# Patient Record
Sex: FEMALE | Race: ASIAN | NOT HISPANIC OR LATINO
[De-identification: names, ages, dates, MRNs, and addresses within clinical notes are randomized per-mention and may not be internally consistent; named-entity substitution may affect disease eponyms.]

---

## 2020-05-13 PROBLEM — Z00.00 ENCOUNTER FOR PREVENTIVE HEALTH EXAMINATION: Status: ACTIVE | Noted: 2020-05-13

## 2020-05-15 ENCOUNTER — APPOINTMENT (OUTPATIENT)
Dept: TRANSPLANT | Facility: CLINIC | Age: 70
End: 2020-05-15

## 2020-06-08 ENCOUNTER — APPOINTMENT (OUTPATIENT)
Dept: SURGERY | Facility: CLINIC | Age: 70
End: 2020-06-08
Payer: MEDICAID

## 2020-06-08 VITALS
TEMPERATURE: 98.1 F | SYSTOLIC BLOOD PRESSURE: 139 MMHG | WEIGHT: 161 LBS | DIASTOLIC BLOOD PRESSURE: 80 MMHG | HEART RATE: 70 BPM | BODY MASS INDEX: 26.82 KG/M2 | HEIGHT: 65 IN

## 2020-06-08 DIAGNOSIS — Z82.49 FAMILY HISTORY OF ISCHEMIC HEART DISEASE AND OTHER DISEASES OF THE CIRCULATORY SYSTEM: ICD-10-CM

## 2020-06-08 DIAGNOSIS — Z78.9 OTHER SPECIFIED HEALTH STATUS: ICD-10-CM

## 2020-06-08 DIAGNOSIS — Z83.3 FAMILY HISTORY OF DIABETES MELLITUS: ICD-10-CM

## 2020-06-08 PROCEDURE — 99203 OFFICE O/P NEW LOW 30 MIN: CPT

## 2020-06-12 ENCOUNTER — OUTPATIENT (OUTPATIENT)
Dept: OUTPATIENT SERVICES | Facility: HOSPITAL | Age: 70
LOS: 1 days | Discharge: HOME | End: 2020-06-12
Payer: MEDICAID

## 2020-06-12 VITALS
SYSTOLIC BLOOD PRESSURE: 138 MMHG | TEMPERATURE: 98 F | HEIGHT: 61 IN | WEIGHT: 153.44 LBS | DIASTOLIC BLOOD PRESSURE: 71 MMHG | HEART RATE: 68 BPM | RESPIRATION RATE: 16 BRPM

## 2020-06-12 DIAGNOSIS — N18.9 CHRONIC KIDNEY DISEASE, UNSPECIFIED: ICD-10-CM

## 2020-06-12 DIAGNOSIS — Z98.890 OTHER SPECIFIED POSTPROCEDURAL STATES: Chronic | ICD-10-CM

## 2020-06-12 DIAGNOSIS — E07.9 DISORDER OF THYROID, UNSPECIFIED: ICD-10-CM

## 2020-06-12 DIAGNOSIS — Z01.818 ENCOUNTER FOR OTHER PREPROCEDURAL EXAMINATION: ICD-10-CM

## 2020-06-12 DIAGNOSIS — E78.5 HYPERLIPIDEMIA, UNSPECIFIED: ICD-10-CM

## 2020-06-12 DIAGNOSIS — I10 ESSENTIAL (PRIMARY) HYPERTENSION: ICD-10-CM

## 2020-06-12 LAB
ANION GAP SERPL CALC-SCNC: 11 MMOL/L — SIGNIFICANT CHANGE UP (ref 5–17)
APTT BLD: 37.3 SEC — HIGH (ref 27.5–36.3)
BASOPHILS # BLD AUTO: 0.05 K/UL — SIGNIFICANT CHANGE UP (ref 0–0.2)
BASOPHILS NFR BLD AUTO: 0.5 % — SIGNIFICANT CHANGE UP (ref 0–2)
BUN SERPL-MCNC: 43 MG/DL — HIGH (ref 7–23)
CALCIUM SERPL-MCNC: 8.1 MG/DL — LOW (ref 8.5–10.1)
CHLORIDE SERPL-SCNC: 102 MMOL/L — SIGNIFICANT CHANGE UP (ref 96–108)
CO2 SERPL-SCNC: 24 MMOL/L — SIGNIFICANT CHANGE UP (ref 22–31)
CREAT SERPL-MCNC: 6.84 MG/DL — HIGH (ref 0.5–1.3)
EOSINOPHIL # BLD AUTO: 0.17 K/UL — SIGNIFICANT CHANGE UP (ref 0–0.5)
EOSINOPHIL NFR BLD AUTO: 1.5 % — SIGNIFICANT CHANGE UP (ref 0–6)
GLUCOSE SERPL-MCNC: 215 MG/DL — HIGH (ref 70–99)
HCT VFR BLD CALC: 30 % — LOW (ref 34.5–45)
HGB BLD-MCNC: 9.7 G/DL — LOW (ref 11.5–15.5)
IMM GRANULOCYTES NFR BLD AUTO: 0.4 % — SIGNIFICANT CHANGE UP (ref 0–1.5)
INR BLD: 0.97 RATIO — SIGNIFICANT CHANGE UP (ref 0.88–1.16)
LYMPHOCYTES # BLD AUTO: 18.3 % — SIGNIFICANT CHANGE UP (ref 13–44)
LYMPHOCYTES # BLD AUTO: 2.01 K/UL — SIGNIFICANT CHANGE UP (ref 1–3.3)
MCHC RBC-ENTMCNC: 28.4 PG — SIGNIFICANT CHANGE UP (ref 27–34)
MCHC RBC-ENTMCNC: 32.3 GM/DL — SIGNIFICANT CHANGE UP (ref 32–36)
MCV RBC AUTO: 88 FL — SIGNIFICANT CHANGE UP (ref 80–100)
MONOCYTES # BLD AUTO: 0.79 K/UL — SIGNIFICANT CHANGE UP (ref 0–0.9)
MONOCYTES NFR BLD AUTO: 7.2 % — SIGNIFICANT CHANGE UP (ref 2–14)
NEUTROPHILS # BLD AUTO: 7.93 K/UL — HIGH (ref 1.8–7.4)
NEUTROPHILS NFR BLD AUTO: 72.1 % — SIGNIFICANT CHANGE UP (ref 43–77)
NRBC # BLD: 0 /100 WBCS — SIGNIFICANT CHANGE UP (ref 0–0)
PLATELET # BLD AUTO: 237 K/UL — SIGNIFICANT CHANGE UP (ref 150–400)
POTASSIUM SERPL-MCNC: 4.1 MMOL/L — SIGNIFICANT CHANGE UP (ref 3.5–5.3)
POTASSIUM SERPL-SCNC: 4.1 MMOL/L — SIGNIFICANT CHANGE UP (ref 3.5–5.3)
PROTHROM AB SERPL-ACNC: 10.8 SEC — SIGNIFICANT CHANGE UP (ref 10–12.9)
RBC # BLD: 3.41 M/UL — LOW (ref 3.8–5.2)
RBC # FLD: 12.6 % — SIGNIFICANT CHANGE UP (ref 10.3–14.5)
SODIUM SERPL-SCNC: 137 MMOL/L — SIGNIFICANT CHANGE UP (ref 135–145)
WBC # BLD: 10.99 K/UL — HIGH (ref 3.8–10.5)
WBC # FLD AUTO: 10.99 K/UL — HIGH (ref 3.8–10.5)

## 2020-06-12 PROCEDURE — 93010 ELECTROCARDIOGRAM REPORT: CPT

## 2020-06-12 NOTE — H&P PST ADULT - HISTORY OF PRESENT ILLNESS
70F pmh htn, dm, hl, esrd, thyroid disease here for PST for scheduled laparoscopic insertion of a peritoneal dialysis catheter  This patient denies any fever, cough, sob, rash, flu like symptoms or travel outside of the [place] in the past 30 days

## 2020-06-12 NOTE — H&P PST ADULT - NSICDXPASTMEDICALHX_GEN_ALL_CORE_FT
PAST MEDICAL HISTORY:  Anemia     DM (diabetes mellitus)     ESRD (end stage renal disease)     GERD (gastroesophageal reflux disease)     HTN (hypertension)     Thyroid disease

## 2020-06-12 NOTE — H&P PST ADULT - NSICDXPROBLEM_GEN_ALL_CORE_FT
PROBLEM DIAGNOSES  Problem: Chronic kidney disease, unspecified  Assessment and Plan: Laparoscopic insertion of a peritoneal dialysis catheter  Pre-op instructions given by RN, patient verbalized understanding  Chlorhexidine wash instructions given   pending: medical clearance  patient to return on 6- for covid testing    Problem: Thyroid disease  Assessment and Plan: Continue current regimen and medications.     Problem: HLD (hyperlipidemia)  Assessment and Plan: Continue current regimen and medications.     Problem: HTN (hypertension)  Assessment and Plan: Continue current regimen and medications.

## 2020-06-12 NOTE — H&P PST ADULT - ASSESSMENT
70F pmh htn, dm, hl, esrd, thyroid disease here for PST for scheduled laparoscopic insertion of a peritoneal dialysis catheter  CAPRINI SCORE    AGE RELATED RISK FACTORS                                                       MOBILITY RELATED FACTORS  [ ] Age 41-60 years                                            (1 Point)                  [ ] Bed rest                                                        (1 Point)  [x ] Age: 61-74 years                                           (2 Points)                [ ] Plaster cast                                                   (2 Points)  [ ] Age= 75 years                                              (3 Points)                 [ ] Bed bound for more than 72 hours                   (2 Points)    DISEASE RELATED RISK FACTORS                                               GENDER SPECIFIC FACTORS  [ x] Edema in the lower extremities                       (1 Point)                  [ ] Pregnancy                                                     (1 Point)  [ ] Varicose veins                                               (1 Point)                  [ ] Post-partum < 6 weeks                                   (1 Point)             [x BMI > 25 Kg/m2                                            (1 Point)                  [ ] Hormonal therapy  or oral contraception            (1 Point)                 [ ] Sepsis (in the previous month)                        (1 Point)                  [ ] History of pregnancy complications  [ ] Pneumonia or serious lung disease                                               [ ] Unexplained or recurrent                       (1 Point)           (in the previous month)                               (1 Point)  [ ] Abnormal pulmonary function test                     (1 Point)                 SURGERY RELATED RISK FACTORS  [ ] Acute myocardial infarction                              (1 Point)                 [ ]  Section                                            (1 Point)  [ ] Congestive heart failure (in the previous month)  (1 Point)                 [ ] Minor surgery                                                 (1 Point)   [ ] Inflammatory bowel disease                             (1 Point)                 [ ] Arthroscopic surgery                                        (2 Points)  [ ] Central venous access                                    (2 Points)                [x ] General surgery lasting more than 45 minutes   (2 Points)       [ ] Stroke (in the previous month)                          (5 Points)               [ ] Elective arthroplasty                                        (5 Points)                                                                                                                                               HEMATOLOGY RELATED FACTORS                                                 TRAUMA RELATED RISK FACTORS  [ ] Prior episodes of VTE                                     (3 Points)                 [ ] Fracture of the hip, pelvis, or leg                       (5 Points)  [ ] Positive family history for VTE                         (3 Points)                 [ ] Acute spinal cord injury (in the previous month)  (5 Points)  [ ] Prothrombin 41858 A                                      (3 Points)                 [ ] Paralysis  (less than 1 month)                          (5 Points)  [ ] Factor V Leiden                                             (3 Points)                 [ ] Multiple Trauma within 1 month                         (5 Points)  [ ] Lupus anticoagulants                                     (3 Points)                                                           [ ] Anticardiolipin antibodies                                (3 Points)                                                       [ ] High homocysteine in the blood                      (3 Points)                                             [ ] Other congenital or acquired thrombophilia       (3 Points)                                                [ ] Heparin induced thrombocytopenia                  (3 Points)                                          Total Score [      6    ]

## 2020-06-14 LAB — SARS-COV-2 RNA SPEC QL NAA+PROBE: SIGNIFICANT CHANGE UP

## 2020-06-15 ENCOUNTER — TRANSCRIPTION ENCOUNTER (OUTPATIENT)
Age: 70
End: 2020-06-15

## 2020-06-16 ENCOUNTER — OUTPATIENT (OUTPATIENT)
Dept: OUTPATIENT SERVICES | Facility: HOSPITAL | Age: 70
LOS: 1 days | Discharge: HOME | End: 2020-06-16
Payer: MEDICAID

## 2020-06-16 ENCOUNTER — APPOINTMENT (OUTPATIENT)
Dept: SURGERY | Facility: HOSPITAL | Age: 70
End: 2020-06-16

## 2020-06-16 VITALS
SYSTOLIC BLOOD PRESSURE: 154 MMHG | DIASTOLIC BLOOD PRESSURE: 74 MMHG | WEIGHT: 160.94 LBS | HEIGHT: 61 IN | OXYGEN SATURATION: 98 % | HEART RATE: 70 BPM | TEMPERATURE: 97 F | RESPIRATION RATE: 18 BRPM

## 2020-06-16 VITALS
HEART RATE: 65 BPM | SYSTOLIC BLOOD PRESSURE: 138 MMHG | RESPIRATION RATE: 16 BRPM | DIASTOLIC BLOOD PRESSURE: 70 MMHG | OXYGEN SATURATION: 98 %

## 2020-06-16 DIAGNOSIS — Z98.890 OTHER SPECIFIED POSTPROCEDURAL STATES: Chronic | ICD-10-CM

## 2020-06-16 LAB
GLUCOSE BLDC GLUCOMTR-MCNC: 166 MG/DL — HIGH (ref 70–99)
GLUCOSE BLDC GLUCOMTR-MCNC: 177 MG/DL — HIGH (ref 70–99)

## 2020-06-16 PROCEDURE — 49324 LAP INSERT TUNNEL IP CATH: CPT

## 2020-06-16 PROCEDURE — 49324 LAP INSERT TUNNEL IP CATH: CPT | Mod: AS

## 2020-06-16 RX ORDER — ONDANSETRON 8 MG/1
4 TABLET, FILM COATED ORAL ONCE
Refills: 0 | Status: COMPLETED | OUTPATIENT
Start: 2020-06-16 | End: 2020-06-16

## 2020-06-16 RX ORDER — HYDROMORPHONE HYDROCHLORIDE 2 MG/ML
0.5 INJECTION INTRAMUSCULAR; INTRAVENOUS; SUBCUTANEOUS
Refills: 0 | Status: DISCONTINUED | OUTPATIENT
Start: 2020-06-16 | End: 2020-06-16

## 2020-06-16 RX ORDER — SODIUM CHLORIDE 9 MG/ML
1000 INJECTION, SOLUTION INTRAVENOUS
Refills: 0 | Status: DISCONTINUED | OUTPATIENT
Start: 2020-06-16 | End: 2020-06-16

## 2020-06-16 RX ADMIN — HYDROMORPHONE HYDROCHLORIDE 0.5 MILLIGRAM(S): 2 INJECTION INTRAMUSCULAR; INTRAVENOUS; SUBCUTANEOUS at 09:35

## 2020-06-16 RX ADMIN — ONDANSETRON 4 MILLIGRAM(S): 8 TABLET, FILM COATED ORAL at 09:39

## 2020-06-16 NOTE — ASU DISCHARGE PLAN (ADULT/PEDIATRIC) - CALL YOUR DOCTOR IF YOU HAVE ANY OF THE FOLLOWING:
Fever greater than (need to indicate Fahrenheit or Celsius)/Swelling that gets worse/Bleeding that does not stop/Wound/Surgical Site with redness, or foul smelling discharge or pus

## 2020-06-16 NOTE — ASU PATIENT PROFILE, ADULT - PMH
Anemia    DM (diabetes mellitus)    ESRD (end stage renal disease)    GERD (gastroesophageal reflux disease)    HTN (hypertension)    Thyroid disease

## 2020-06-16 NOTE — BRIEF OPERATIVE NOTE - NSICDXBRIEFPROCEDURE_GEN_ALL_CORE_FT
PROCEDURES:  Laparoscopy, with peritoneal dialysis catheter insertion 16-Jun-2020 09:13:52  Srinivasa Diego

## 2020-06-17 DIAGNOSIS — G89.18 OTHER ACUTE POSTPROCEDURAL PAIN: ICD-10-CM

## 2020-06-18 DIAGNOSIS — Z79.4 LONG TERM (CURRENT) USE OF INSULIN: ICD-10-CM

## 2020-06-18 DIAGNOSIS — D64.9 ANEMIA, UNSPECIFIED: ICD-10-CM

## 2020-06-18 DIAGNOSIS — K21.9 GASTRO-ESOPHAGEAL REFLUX DISEASE WITHOUT ESOPHAGITIS: ICD-10-CM

## 2020-06-18 DIAGNOSIS — N18.6 END STAGE RENAL DISEASE: ICD-10-CM

## 2020-06-18 DIAGNOSIS — E78.5 HYPERLIPIDEMIA, UNSPECIFIED: ICD-10-CM

## 2020-06-18 DIAGNOSIS — I12.0 HYPERTENSIVE CHRONIC KIDNEY DISEASE WITH STAGE 5 CHRONIC KIDNEY DISEASE OR END STAGE RENAL DISEASE: ICD-10-CM

## 2020-06-18 DIAGNOSIS — E03.9 HYPOTHYROIDISM, UNSPECIFIED: ICD-10-CM

## 2020-06-18 DIAGNOSIS — E11.22 TYPE 2 DIABETES MELLITUS WITH DIABETIC CHRONIC KIDNEY DISEASE: ICD-10-CM

## 2020-06-20 ENCOUNTER — INPATIENT (INPATIENT)
Facility: HOSPITAL | Age: 70
LOS: 2 days | Discharge: HOME | End: 2020-06-23
Attending: INTERNAL MEDICINE | Admitting: INTERNAL MEDICINE
Payer: MEDICAID

## 2020-06-20 VITALS — HEIGHT: 61 IN

## 2020-06-20 DIAGNOSIS — E07.9 DISORDER OF THYROID, UNSPECIFIED: ICD-10-CM

## 2020-06-20 DIAGNOSIS — N18.6 END STAGE RENAL DISEASE: ICD-10-CM

## 2020-06-20 DIAGNOSIS — Z98.890 OTHER SPECIFIED POSTPROCEDURAL STATES: Chronic | ICD-10-CM

## 2020-06-20 DIAGNOSIS — E87.79 OTHER FLUID OVERLOAD: ICD-10-CM

## 2020-06-20 DIAGNOSIS — I10 ESSENTIAL (PRIMARY) HYPERTENSION: ICD-10-CM

## 2020-06-20 DIAGNOSIS — R11.2 NAUSEA WITH VOMITING, UNSPECIFIED: ICD-10-CM

## 2020-06-20 DIAGNOSIS — E11.9 TYPE 2 DIABETES MELLITUS WITHOUT COMPLICATIONS: ICD-10-CM

## 2020-06-20 DIAGNOSIS — Z29.9 ENCOUNTER FOR PROPHYLACTIC MEASURES, UNSPECIFIED: ICD-10-CM

## 2020-06-20 LAB
ALBUMIN SERPL ELPH-MCNC: 4.1 G/DL — SIGNIFICANT CHANGE UP (ref 3.3–5)
ALP SERPL-CCNC: 104 U/L — SIGNIFICANT CHANGE UP (ref 40–120)
ALT FLD-CCNC: < 5 U/L — SIGNIFICANT CHANGE UP (ref 4–33)
ANION GAP SERPL CALC-SCNC: 18 MMO/L — HIGH (ref 7–14)
APTT BLD: 37.5 SEC — HIGH (ref 27.5–36.3)
AST SERPL-CCNC: 12 U/L — SIGNIFICANT CHANGE UP (ref 4–32)
BASE EXCESS BLDV CALC-SCNC: -2.4 MMOL/L — SIGNIFICANT CHANGE UP
BASOPHILS # BLD AUTO: 0.05 K/UL — SIGNIFICANT CHANGE UP (ref 0–0.2)
BASOPHILS NFR BLD AUTO: 0.4 % — SIGNIFICANT CHANGE UP (ref 0–2)
BILIRUB SERPL-MCNC: 0.4 MG/DL — SIGNIFICANT CHANGE UP (ref 0.2–1.2)
BLD GP AB SCN SERPL QL: NEGATIVE — SIGNIFICANT CHANGE UP
BLOOD GAS VENOUS - CREATININE: 6.32 MG/DL — HIGH (ref 0.5–1.3)
BLOOD GAS VENOUS - FIO2: 21 — SIGNIFICANT CHANGE UP
BUN SERPL-MCNC: 58 MG/DL — HIGH (ref 7–23)
CALCIUM SERPL-MCNC: 8.5 MG/DL — SIGNIFICANT CHANGE UP (ref 8.4–10.5)
CHLORIDE BLDV-SCNC: 109 MMOL/L — HIGH (ref 96–108)
CHLORIDE SERPL-SCNC: 102 MMOL/L — SIGNIFICANT CHANGE UP (ref 98–107)
CO2 SERPL-SCNC: 20 MMOL/L — LOW (ref 22–31)
CREAT SERPL-MCNC: 6.68 MG/DL — HIGH (ref 0.5–1.3)
EOSINOPHIL # BLD AUTO: 0.12 K/UL — SIGNIFICANT CHANGE UP (ref 0–0.5)
EOSINOPHIL NFR BLD AUTO: 0.9 % — SIGNIFICANT CHANGE UP (ref 0–6)
GAS PNL BLDV: 139 MMOL/L — SIGNIFICANT CHANGE UP (ref 136–146)
GLUCOSE BLDC GLUCOMTR-MCNC: 188 MG/DL — HIGH (ref 70–99)
GLUCOSE BLDV-MCNC: 129 MG/DL — HIGH (ref 70–99)
GLUCOSE SERPL-MCNC: 142 MG/DL — HIGH (ref 70–99)
HBV SURFACE AG SER-ACNC: NEGATIVE — SIGNIFICANT CHANGE UP
HCO3 BLDV-SCNC: 22 MMOL/L — SIGNIFICANT CHANGE UP (ref 20–27)
HCT VFR BLD CALC: 27.5 % — LOW (ref 34.5–45)
HCT VFR BLDV CALC: 28.6 % — LOW (ref 34.5–45)
HGB BLD-MCNC: 9 G/DL — LOW (ref 11.5–15.5)
HGB BLDV-MCNC: 9.2 G/DL — LOW (ref 11.5–15.5)
IMM GRANULOCYTES NFR BLD AUTO: 0.7 % — SIGNIFICANT CHANGE UP (ref 0–1.5)
INR BLD: 1.08 — SIGNIFICANT CHANGE UP (ref 0.88–1.17)
LACTATE BLDV-MCNC: 1.1 MMOL/L — SIGNIFICANT CHANGE UP (ref 0.5–2)
LIDOCAIN IGE QN: 57 U/L — SIGNIFICANT CHANGE UP (ref 7–60)
LYMPHOCYTES # BLD AUTO: 1.83 K/UL — SIGNIFICANT CHANGE UP (ref 1–3.3)
LYMPHOCYTES # BLD AUTO: 13.7 % — SIGNIFICANT CHANGE UP (ref 13–44)
MCHC RBC-ENTMCNC: 28.8 PG — SIGNIFICANT CHANGE UP (ref 27–34)
MCHC RBC-ENTMCNC: 32.7 % — SIGNIFICANT CHANGE UP (ref 32–36)
MCV RBC AUTO: 87.9 FL — SIGNIFICANT CHANGE UP (ref 80–100)
MONOCYTES # BLD AUTO: 0.77 K/UL — SIGNIFICANT CHANGE UP (ref 0–0.9)
MONOCYTES NFR BLD AUTO: 5.7 % — SIGNIFICANT CHANGE UP (ref 2–14)
NEUTROPHILS # BLD AUTO: 10.53 K/UL — HIGH (ref 1.8–7.4)
NEUTROPHILS NFR BLD AUTO: 78.6 % — HIGH (ref 43–77)
NRBC # FLD: 0 K/UL — SIGNIFICANT CHANGE UP (ref 0–0)
PCO2 BLDV: 38 MMHG — LOW (ref 41–51)
PH BLDV: 7.38 PH — SIGNIFICANT CHANGE UP (ref 7.32–7.43)
PLATELET # BLD AUTO: 195 K/UL — SIGNIFICANT CHANGE UP (ref 150–400)
PMV BLD: 12.8 FL — SIGNIFICANT CHANGE UP (ref 7–13)
PO2 BLDV: 46 MMHG — HIGH (ref 35–40)
POTASSIUM BLDV-SCNC: 4.5 MMOL/L — SIGNIFICANT CHANGE UP (ref 3.4–4.5)
POTASSIUM SERPL-MCNC: 4.7 MMOL/L — SIGNIFICANT CHANGE UP (ref 3.5–5.3)
POTASSIUM SERPL-SCNC: 4.7 MMOL/L — SIGNIFICANT CHANGE UP (ref 3.5–5.3)
PROT SERPL-MCNC: 7.5 G/DL — SIGNIFICANT CHANGE UP (ref 6–8.3)
PROTHROM AB SERPL-ACNC: 12.5 SEC — SIGNIFICANT CHANGE UP (ref 9.8–13.1)
RBC # BLD: 3.13 M/UL — LOW (ref 3.8–5.2)
RBC # FLD: 12.9 % — SIGNIFICANT CHANGE UP (ref 10.3–14.5)
RH IG SCN BLD-IMP: POSITIVE — SIGNIFICANT CHANGE UP
RH IG SCN BLD-IMP: POSITIVE — SIGNIFICANT CHANGE UP
SAO2 % BLDV: 81.5 % — SIGNIFICANT CHANGE UP (ref 60–85)
SARS-COV-2 RNA SPEC QL NAA+PROBE: SIGNIFICANT CHANGE UP
SODIUM SERPL-SCNC: 140 MMOL/L — SIGNIFICANT CHANGE UP (ref 135–145)
WBC # BLD: 13.4 K/UL — HIGH (ref 3.8–10.5)
WBC # FLD AUTO: 13.4 K/UL — HIGH (ref 3.8–10.5)

## 2020-06-20 PROCEDURE — 99233 SBSQ HOSP IP/OBS HIGH 50: CPT

## 2020-06-20 PROCEDURE — 74176 CT ABD & PELVIS W/O CONTRAST: CPT | Mod: 26

## 2020-06-20 PROCEDURE — 99285 EMERGENCY DEPT VISIT HI MDM: CPT

## 2020-06-20 PROCEDURE — 71045 X-RAY EXAM CHEST 1 VIEW: CPT | Mod: 26

## 2020-06-20 RX ORDER — DEXTROSE 50 % IN WATER 50 %
15 SYRINGE (ML) INTRAVENOUS ONCE
Refills: 0 | Status: DISCONTINUED | OUTPATIENT
Start: 2020-06-20 | End: 2020-06-23

## 2020-06-20 RX ORDER — SODIUM CHLORIDE 9 MG/ML
1000 INJECTION INTRAMUSCULAR; INTRAVENOUS; SUBCUTANEOUS ONCE
Refills: 0 | Status: COMPLETED | OUTPATIENT
Start: 2020-06-20 | End: 2020-06-20

## 2020-06-20 RX ORDER — ONDANSETRON 8 MG/1
4 TABLET, FILM COATED ORAL EVERY 6 HOURS
Refills: 0 | Status: DISCONTINUED | OUTPATIENT
Start: 2020-06-20 | End: 2020-06-23

## 2020-06-20 RX ORDER — BISOPROLOL FUMARATE 10 MG/1
1 TABLET, FILM COATED ORAL
Qty: 0 | Refills: 0 | DISCHARGE

## 2020-06-20 RX ORDER — DEXTROSE 50 % IN WATER 50 %
25 SYRINGE (ML) INTRAVENOUS ONCE
Refills: 0 | Status: DISCONTINUED | OUTPATIENT
Start: 2020-06-20 | End: 2020-06-23

## 2020-06-20 RX ORDER — SODIUM BICARBONATE 1 MEQ/ML
650 SYRINGE (ML) INTRAVENOUS
Refills: 0 | Status: DISCONTINUED | OUTPATIENT
Start: 2020-06-20 | End: 2020-06-23

## 2020-06-20 RX ORDER — BISOPROLOL FUMARATE 10 MG/1
5 TABLET, FILM COATED ORAL AT BEDTIME
Refills: 0 | Status: DISCONTINUED | OUTPATIENT
Start: 2020-06-20 | End: 2020-06-23

## 2020-06-20 RX ORDER — LEVOTHYROXINE SODIUM 125 MCG
50 TABLET ORAL DAILY
Refills: 0 | Status: DISCONTINUED | OUTPATIENT
Start: 2020-06-20 | End: 2020-06-23

## 2020-06-20 RX ORDER — ATORVASTATIN CALCIUM 80 MG/1
40 TABLET, FILM COATED ORAL AT BEDTIME
Refills: 0 | Status: DISCONTINUED | OUTPATIENT
Start: 2020-06-20 | End: 2020-06-23

## 2020-06-20 RX ORDER — FUROSEMIDE 40 MG
80 TABLET ORAL ONCE
Refills: 0 | Status: COMPLETED | OUTPATIENT
Start: 2020-06-20 | End: 2020-06-20

## 2020-06-20 RX ORDER — INSULIN LISPRO 100/ML
VIAL (ML) SUBCUTANEOUS
Refills: 0 | Status: DISCONTINUED | OUTPATIENT
Start: 2020-06-20 | End: 2020-06-23

## 2020-06-20 RX ORDER — FUROSEMIDE 40 MG
80 TABLET ORAL ONCE
Refills: 0 | Status: COMPLETED | OUTPATIENT
Start: 2020-06-20 | End: 2020-06-21

## 2020-06-20 RX ORDER — DEXTROSE 50 % IN WATER 50 %
12.5 SYRINGE (ML) INTRAVENOUS ONCE
Refills: 0 | Status: DISCONTINUED | OUTPATIENT
Start: 2020-06-20 | End: 2020-06-23

## 2020-06-20 RX ORDER — AMLODIPINE BESYLATE 2.5 MG/1
10 TABLET ORAL DAILY
Refills: 0 | Status: DISCONTINUED | OUTPATIENT
Start: 2020-06-20 | End: 2020-06-23

## 2020-06-20 RX ORDER — PANTOPRAZOLE SODIUM 20 MG/1
40 TABLET, DELAYED RELEASE ORAL
Refills: 0 | Status: DISCONTINUED | OUTPATIENT
Start: 2020-06-20 | End: 2020-06-23

## 2020-06-20 RX ORDER — SEVELAMER CARBONATE 2400 MG/1
1600 POWDER, FOR SUSPENSION ORAL
Refills: 0 | Status: DISCONTINUED | OUTPATIENT
Start: 2020-06-20 | End: 2020-06-23

## 2020-06-20 RX ORDER — INSULIN LISPRO 100/ML
VIAL (ML) SUBCUTANEOUS AT BEDTIME
Refills: 0 | Status: DISCONTINUED | OUTPATIENT
Start: 2020-06-20 | End: 2020-06-23

## 2020-06-20 RX ORDER — GLUCAGON INJECTION, SOLUTION 0.5 MG/.1ML
1 INJECTION, SOLUTION SUBCUTANEOUS ONCE
Refills: 0 | Status: DISCONTINUED | OUTPATIENT
Start: 2020-06-20 | End: 2020-06-23

## 2020-06-20 RX ORDER — SODIUM CHLORIDE 9 MG/ML
1000 INJECTION, SOLUTION INTRAVENOUS
Refills: 0 | Status: DISCONTINUED | OUTPATIENT
Start: 2020-06-20 | End: 2020-06-23

## 2020-06-20 RX ORDER — HEPARIN SODIUM 5000 [USP'U]/ML
5000 INJECTION INTRAVENOUS; SUBCUTANEOUS EVERY 8 HOURS
Refills: 0 | Status: DISCONTINUED | OUTPATIENT
Start: 2020-06-20 | End: 2020-06-23

## 2020-06-20 RX ADMIN — AMLODIPINE BESYLATE 10 MILLIGRAM(S): 2.5 TABLET ORAL at 23:16

## 2020-06-20 RX ADMIN — HEPARIN SODIUM 5000 UNIT(S): 5000 INJECTION INTRAVENOUS; SUBCUTANEOUS at 23:16

## 2020-06-20 RX ADMIN — Medication 80 MILLIGRAM(S): at 16:25

## 2020-06-20 RX ADMIN — SODIUM CHLORIDE 1000 MILLILITER(S): 9 INJECTION INTRAMUSCULAR; INTRAVENOUS; SUBCUTANEOUS at 13:27

## 2020-06-20 RX ADMIN — Medication 650 MILLIGRAM(S): at 18:52

## 2020-06-20 RX ADMIN — ATORVASTATIN CALCIUM 40 MILLIGRAM(S): 80 TABLET, FILM COATED ORAL at 23:15

## 2020-06-20 NOTE — H&P ADULT - NSHPREVIEWOFSYSTEMS_GEN_ALL_CORE
CONSTITUTIONAL: No fever; No chills; No night sweats; No weight loss; No fatigue  EYES: No eye pain; No blurry vision  ENMT:  No difficulty hearing; No sinus or throat pain  NECK: No pain or stiffness  RESPIRATORY: No cough; No wheezing; No hemoptysis; +shortness of breath; No sputum production  CARDIOVASCULAR: No chest pain; No palpitations; No leg swelling  GASTROINTESTINAL: No abdominal pain; +nausea; +vomiting; No hematemesis; No diarrhea; No constipation. No melena or hematochezia.  GENITOURINARY: No dysuria; No frequency; No hematuria; No incontinence  NEUROLOGICAL: No headaches; No loss of strength; No numbness  SKIN: No itching/burning; No rashes or lesions   MUSCULOSKELETAL: No joint pain or swelling; No muscle, back, or extremity pain  HEME/LYMPH: No easy bruising, or bleeding gums

## 2020-06-20 NOTE — ED ADULT NURSE NOTE - CHIEF COMPLAINT QUOTE
pt had percutaneous cath placed for home dialysis on mon day  pt has been vomiting  and today vomited 4 times  told to come to er. deneis any other complaints [pt weak and pale at   DM2 pt has DM, htn thyroid , hi cholestrol, and anemia   fs at triage 136   son phone number [phone] Jonathan  son states mom gets very anxious when dialysis is mentioned .

## 2020-06-20 NOTE — CONSULT NOTE ADULT - SUBJECTIVE AND OBJECTIVE BOX
NAISA DONDORIS    Patient is a 70y old  Female who presents with a chief complaint of SOB (20 Jun 2020 18:01)    HPI:    This is a patient with ESRD who was sent to the ER for vomiting. 4 episodes this am. Usually has am nausea. Recent PD catheter insertion.  Reported use of narcotic pain killer yesterday    HEALTH ISSUES - PROBLEM Dx:  Prophylactic measure: Prophylactic measure  Thyroid disease: Thyroid disease  Essential hypertension: Essential hypertension  Type 2 diabetes mellitus without complication, without long-term current use of insulin: Type 2 diabetes mellitus without complication, without long-term current use of insulin  ESRD (end stage renal disease): ESRD (end stage renal disease)  Non-intractable vomiting with nausea: Non-intractable vomiting with nausea  Other hypervolemia: Other hypervolemia        MEDICATIONS  (STANDING):  amLODIPine   Tablet 10 milliGRAM(s) Oral daily  atorvastatin 40 milliGRAM(s) Oral at bedtime  bisoprolol   Tablet 5 milliGRAM(s) Oral at bedtime  dextrose 5%. 1000 milliLiter(s) (50 mL/Hr) IV Continuous <Continuous>  dextrose 50% Injectable 12.5 Gram(s) IV Push once  dextrose 50% Injectable 25 Gram(s) IV Push once  dextrose 50% Injectable 25 Gram(s) IV Push once  heparin   Injectable 5000 Unit(s) SubCutaneous every 8 hours  insulin lispro (HumaLOG) corrective regimen sliding scale   SubCutaneous three times a day before meals  insulin lispro (HumaLOG) corrective regimen sliding scale   SubCutaneous at bedtime  levothyroxine 50 MICROGram(s) Oral daily  pantoprazole    Tablet 40 milliGRAM(s) Oral before breakfast  sevelamer carbonate 1600 milliGRAM(s) Oral three times a day with meals  sodium bicarbonate 650 milliGRAM(s) Oral two times a day    MEDICATIONS  (PRN):  dextrose 40% Gel 15 Gram(s) Oral once PRN Blood Glucose LESS THAN 70 milliGRAM(s)/deciliter  glucagon  Injectable 1 milliGRAM(s) IntraMuscular once PRN Glucose LESS THAN 70 milligrams/deciliter  ondansetron Injectable 4 milliGRAM(s) IV Push every 6 hours PRN Nausea.    Vital Signs Last 24 Hrs  T(C): 37.1 (20 Jun 2020 18:52), Max: 37.1 (20 Jun 2020 15:58)  T(F): 98.7 (20 Jun 2020 18:52), Max: 98.8 (20 Jun 2020 15:58)  HR: 80 (20 Jun 2020 18:52) (72 - 80)  BP: 158/61 (20 Jun 2020 18:52) (158/61 - 186/88)  BP(mean): --  RR: 18 (20 Jun 2020 18:52) (16 - 26)  SpO2: 98% (20 Jun 2020 18:52) (89% - 100%)  Daily Height in cm: 154.94 (20 Jun 2020 12:41)    Daily     PHYSICAL EXAM:  Constitutional:  She appears comfortable and not distressed. Not diaphoretic.    Neck:  The thyroid is normal. Trachea is midline.     Respiratory: The lungs are clear to auscultation. No dullness and expansion is normal.    Cardiovascular: S1 and S2 are normal. No mummurs, rubs or gallops are present.    Gastrointestinal: The abdomen is soft. No tenderness is present. No masses are present. Bowel sounds are normal.    Genitourinary: The bladder is not distended. No CVA tenderness is present.    Extremities: No edema is noted. No deformities are present.    Neurological: Cognition is normal. Tone, power and sensation are normal.     Skin: No lesions are seen  or palpated.                              9.0    13.40 )-----------( 195      ( 20 Jun 2020 13:25 )             27.5     06-20    140  |  102  |  58<H>  ----------------------------<  142<H>  4.7   |  20<L>  |  6.68<H>    Ca    8.5      20 Jun 2020 13:25    TPro  7.5  /  Alb  4.1  /  TBili  0.4  /  DBili  x   /  AST  12  /  ALT  < 5  /  AlkPhos  104  06-20

## 2020-06-20 NOTE — H&P ADULT - HISTORY OF PRESENT ILLNESS
70F with PMH HTN, T2DM, ESRD with recently placed PD cath, not started on HD yet, presented with nausea and vomiting. Patient's son provided translation at bedside. He reports patient been experiencing morning nausea and vomiting, usually once a day in the morning. This morning she had 4 episodes of NBNB vomiting. Denied any associated abdominal pain or diarrhea. The son was concern, thus called patient's nephrologist, who instructed patient to come to ED. Patient's vomiting had resolved at time of arrival. Patient denied any CP or SOB during this time.     In ED patient was afebrile, hypertensive, saturating 100% on RA. She received 1L NS bolus in ED. Labs were grossly unremarkable except for elevated Cr. In ED, patient became short of breath and noted to become hypoxic to 80s on RA and subsequently placed on NC and received Lasix 80 IVP. She had CT a/p which didn't show any acute pathology.

## 2020-06-20 NOTE — ED ADULT NURSE NOTE - OBJECTIVE STATEMENT
Pt presenting to room 26 AxOx4 ambulatory at baseline with c.o nausea/vomiting x1 day. Past medical history of HTN, CKD- pt had a percutaneous catheter for peritoneal dialysis placed several days ago. Catheter site appears intact, nontender without redness noted. RR even and unlabored. Palor/diaphoresis not noted. IV established with 20g in RAC, labs drawn and sent. MD at bedside, will continue to monitor.

## 2020-06-20 NOTE — H&P ADULT - PROBLEM SELECTOR PLAN 5
hypertensive at this time, possibly due to fluid?  c/w amlodipine  c/w Bisoprolol  hydralazine IVP if patient remain hypertensive.

## 2020-06-20 NOTE — H&P ADULT - PROBLEM SELECTOR PLAN 1
found hypoxic in ED after IVF, mildly tachypneic.   lungs clear at time of evaluation.   CT showing mild b/l pleural effusion, which is likely due to chronic renal failure.   s/p Lasix 80 in ED.   monitor O2 sat, can give additional lasix prn based on response.  Nephrology following. might need to initial HD if not responding to lasix.

## 2020-06-20 NOTE — H&P ADULT - PROBLEM SELECTOR PLAN 3
currently not on HD yet.   s/p PD cath  nephro following, f/u recs.   no indication for urgent HD at his time.

## 2020-06-20 NOTE — ED PROVIDER NOTE - ATTENDING CONTRIBUTION TO CARE
agree with PA note    "69 y/o female pmh htn, dm, ESRD no HD c/o vomiting x today. Pt is Uz[place] speaking,  # [id number], Pt admits to vomiting x4 this AM and son brought her to the ER. Pt states that the vomiting was white and watery, denies blood or black color. Pt now denies any complaints. Denies chest pain, sob, abd pain, diarrhea, numbness, tingling, weakness, dizziness, syncope, fever or chills. Pt states that she had surgery x5 days ago by her belly button but does not know what for."  Pt denies any complaints to me at this time    PE: well appearing; VSS; CTAB/L; s1 s2 no m/r/g abd soft/NT/ND PD catheter C/D/I; ext: no edema    given vomiting and age and recent procedure will get CT but pt much improved from initial arrival without any interventions

## 2020-06-20 NOTE — CONSULT NOTE ADULT - ASSESSMENT
ESRD: S/P PD catheter 4 days ago. Not on PD as yet. She is clinically euvolemic at this time.  - I will not start HD or low volume PD but do a catheter flush today.    Vomiting: Unlikely due to uremia. She feels better not. Possible due to gastroparesis. She may have gotten a narcotic for pain but thet is unclear.  - Hold HD.  - Odansetron PRN.  - Follow up BMP.    Leukocytosis:  No fever or any other findings of SIRS.  - Monitor.  - Catheter flush.

## 2020-06-20 NOTE — ED PROVIDER NOTE - CLINICAL SUMMARY MEDICAL DECISION MAKING FREE TEXT BOX
71 y/o female pmh htn, hld, ESRD w/ vomiting x4 today after peritoneal dialysis catheter placement x5 days ago- will check labs, CT abd and reassess

## 2020-06-20 NOTE — ED ADULT NURSE REASSESSMENT NOTE - NS ED NURSE REASSESS COMMENT FT1
Pt resting comfortably at this time with 2L NC, maintaining O2 saturation at 96%. Pt remains dyspneic with RR low 20s. Palor/diaphoresis not noted. Report given to SHAYLA Banks on accepting unit. Pt awaiting transport. Will continue to monitor.

## 2020-06-20 NOTE — ED ADULT TRIAGE NOTE - CHIEF COMPLAINT QUOTE
pt had percutaneous cath placed for home dialysis on mon day  pt has been vomiting  and today vomited 4 times  told to come to er. deneis any other complaints [pt weak and pale at   DM2 pt has DM, htn thyroid , hi cholestrol, and anemia   fs at triage 136   son phone number [phone] Jonathan pt had percutaneous cath placed for home dialysis on mon day  pt has been vomiting  and today vomited 4 times  told to come to er. deneis any other complaints [pt weak and pale at   DM2 pt has DM, htn thyroid , hi cholestrol, and anemia   fs at triage 136   son phone number [phone] Jonahtan  son states mom gets very anxious when dialysis is mentioned .

## 2020-06-20 NOTE — ED PROVIDER NOTE - OBJECTIVE STATEMENT
71 y/o female pmh htn, dm, ESRD no HD c/o vomiting x today. Pt is Estonian speaking,  # [id number], Pt admits to vomiting x4 this AM and son brought her to the ER. Pt states that the vomiting was white and watery, denies blood or black color. Pt now denies any complaints. Denies chest pain, sob, abd pain, diarrhea, numbness, tingling, weakness, dizziness, syncope, fever or chills. Pt states that she had surgery x5 days ago by her belly button but does not know what for.

## 2020-06-20 NOTE — ED PROVIDER NOTE - PHYSICAL EXAMINATION
Peritoneal dialysis catheter in place, laparoscopic surgery site dry, sutures in place, no erythema or drainage.

## 2020-06-20 NOTE — H&P ADULT - ASSESSMENT
70F with HTN, T2DM, ESRD not on dialysis yet presented with GI symptoms, which resolved on presentation. Her GI symptoms are possible related to uremia vs underline gastroparesis from long standing DM. She became hypoxic in ED after receiving IVF. noted to be hypertensive, likely fluid overload.

## 2020-06-20 NOTE — H&P ADULT - NSHPPHYSICALEXAM_GEN_ALL_CORE
T(C): 37.1 (06-20-20 @ 15:58), Max: 37.1 (06-20-20 @ 15:58)  HR: 79 (06-20-20 @ 16:25) (72 - 79)  BP: 184/76 (06-20-20 @ 16:25) (169/70 - 184/76)  RR: 26 (06-20-20 @ 16:25) (16 - 26)  SpO2: 94% (06-20-20 @ 16:25) (89% - 100%)    GENERAL: no apparent distress, on room air  HEAD:  Atraumatic, Normocephalic  EYES: EOMI, PERRLA, conjunctiva and sclera clear b/l  NECK: No cervical lymphadenopathy; no obvious masses.   CHEST/LUNG: Clear to auscultation bilaterally; No wheezing or crackles  HEART: Regular rate and rhythm; No obvious murmurs; nl S1/S2; No peripheral edema  ABDOMEN: Soft, Nontender, Nondistended; Bowel sounds present; RLQ PD cath in place, surgical site is clean and w/o erythema.   EXTREMITIES:  2+ Peripheral Pulses; No joint swelling.  PSYCH: normal affect, calm demeanor  NEUROLOGY: Awake and alert; CN 2-12 grossly intact; no obvious FND  SKIN: No rashes or lesions

## 2020-06-21 DIAGNOSIS — Z03.818 ENCOUNTER FOR OBSERVATION FOR SUSPECTED EXPOSURE TO OTHER BIOLOGICAL AGENTS RULED OUT: ICD-10-CM

## 2020-06-21 DIAGNOSIS — R50.9 FEVER, UNSPECIFIED: ICD-10-CM

## 2020-06-21 DIAGNOSIS — J96.01 ACUTE RESPIRATORY FAILURE WITH HYPOXIA: ICD-10-CM

## 2020-06-21 LAB
ALBUMIN SERPL ELPH-MCNC: 3.4 G/DL — SIGNIFICANT CHANGE UP (ref 3.3–5)
ALP SERPL-CCNC: 85 U/L — SIGNIFICANT CHANGE UP (ref 40–120)
ALT FLD-CCNC: < 4 U/L — LOW (ref 4–33)
ANION GAP SERPL CALC-SCNC: 15 MMO/L — HIGH (ref 7–14)
APPEARANCE UR: CLEAR — SIGNIFICANT CHANGE UP
AST SERPL-CCNC: 10 U/L — SIGNIFICANT CHANGE UP (ref 4–32)
BACTERIA # UR AUTO: NEGATIVE — SIGNIFICANT CHANGE UP
BASOPHILS # BLD AUTO: 0.03 K/UL — SIGNIFICANT CHANGE UP (ref 0–0.2)
BASOPHILS NFR BLD AUTO: 0.3 % — SIGNIFICANT CHANGE UP (ref 0–2)
BILIRUB SERPL-MCNC: 0.4 MG/DL — SIGNIFICANT CHANGE UP (ref 0.2–1.2)
BILIRUB UR-MCNC: NEGATIVE — SIGNIFICANT CHANGE UP
BLOOD UR QL VISUAL: SIGNIFICANT CHANGE UP
BUN SERPL-MCNC: 60 MG/DL — HIGH (ref 7–23)
CALCIUM SERPL-MCNC: 7.8 MG/DL — LOW (ref 8.4–10.5)
CHLORIDE SERPL-SCNC: 101 MMOL/L — SIGNIFICANT CHANGE UP (ref 98–107)
CO2 SERPL-SCNC: 22 MMOL/L — SIGNIFICANT CHANGE UP (ref 22–31)
COLOR SPEC: SIGNIFICANT CHANGE UP
CREAT SERPL-MCNC: 6.65 MG/DL — HIGH (ref 0.5–1.3)
EOSINOPHIL # BLD AUTO: 0.07 K/UL — SIGNIFICANT CHANGE UP (ref 0–0.5)
EOSINOPHIL NFR BLD AUTO: 0.6 % — SIGNIFICANT CHANGE UP (ref 0–6)
GLUCOSE BLDC GLUCOMTR-MCNC: 124 MG/DL — HIGH (ref 70–99)
GLUCOSE BLDC GLUCOMTR-MCNC: 137 MG/DL — HIGH (ref 70–99)
GLUCOSE BLDC GLUCOMTR-MCNC: 141 MG/DL — HIGH (ref 70–99)
GLUCOSE BLDC GLUCOMTR-MCNC: 183 MG/DL — HIGH (ref 70–99)
GLUCOSE SERPL-MCNC: 113 MG/DL — HIGH (ref 70–99)
GLUCOSE UR-MCNC: 50 — SIGNIFICANT CHANGE UP
HBA1C BLD-MCNC: 6.3 % — HIGH (ref 4–5.6)
HCT VFR BLD CALC: 24.8 % — LOW (ref 34.5–45)
HCV AB S/CO SERPL IA: 0.08 S/CO — SIGNIFICANT CHANGE UP (ref 0–0.99)
HCV AB SERPL-IMP: SIGNIFICANT CHANGE UP
HGB BLD-MCNC: 7.9 G/DL — LOW (ref 11.5–15.5)
HYALINE CASTS # UR AUTO: NEGATIVE — SIGNIFICANT CHANGE UP
IMM GRANULOCYTES NFR BLD AUTO: 0.5 % — SIGNIFICANT CHANGE UP (ref 0–1.5)
KETONES UR-MCNC: NEGATIVE — SIGNIFICANT CHANGE UP
LEUKOCYTE ESTERASE UR-ACNC: NEGATIVE — SIGNIFICANT CHANGE UP
LYMPHOCYTES # BLD AUTO: 1.33 K/UL — SIGNIFICANT CHANGE UP (ref 1–3.3)
LYMPHOCYTES # BLD AUTO: 11.8 % — LOW (ref 13–44)
MAGNESIUM SERPL-MCNC: 1.6 MG/DL — SIGNIFICANT CHANGE UP (ref 1.6–2.6)
MCHC RBC-ENTMCNC: 28.1 PG — SIGNIFICANT CHANGE UP (ref 27–34)
MCHC RBC-ENTMCNC: 31.9 % — LOW (ref 32–36)
MCV RBC AUTO: 88.3 FL — SIGNIFICANT CHANGE UP (ref 80–100)
MONOCYTES # BLD AUTO: 0.81 K/UL — SIGNIFICANT CHANGE UP (ref 0–0.9)
MONOCYTES NFR BLD AUTO: 7.2 % — SIGNIFICANT CHANGE UP (ref 2–14)
NEUTROPHILS # BLD AUTO: 8.97 K/UL — HIGH (ref 1.8–7.4)
NEUTROPHILS NFR BLD AUTO: 79.6 % — HIGH (ref 43–77)
NITRITE UR-MCNC: NEGATIVE — SIGNIFICANT CHANGE UP
NRBC # FLD: 0 K/UL — SIGNIFICANT CHANGE UP (ref 0–0)
PH UR: 6.5 — SIGNIFICANT CHANGE UP (ref 5–8)
PHOSPHATE SERPL-MCNC: 5.4 MG/DL — HIGH (ref 2.5–4.5)
PLATELET # BLD AUTO: 180 K/UL — SIGNIFICANT CHANGE UP (ref 150–400)
PMV BLD: 12.7 FL — SIGNIFICANT CHANGE UP (ref 7–13)
POTASSIUM SERPL-MCNC: 4.6 MMOL/L — SIGNIFICANT CHANGE UP (ref 3.5–5.3)
POTASSIUM SERPL-SCNC: 4.6 MMOL/L — SIGNIFICANT CHANGE UP (ref 3.5–5.3)
PROT SERPL-MCNC: 6.5 G/DL — SIGNIFICANT CHANGE UP (ref 6–8.3)
PROT UR-MCNC: 600 — HIGH
RBC # BLD: 2.81 M/UL — LOW (ref 3.8–5.2)
RBC # FLD: 12.6 % — SIGNIFICANT CHANGE UP (ref 10.3–14.5)
RBC CASTS # UR COMP ASSIST: SIGNIFICANT CHANGE UP (ref 0–?)
SODIUM SERPL-SCNC: 138 MMOL/L — SIGNIFICANT CHANGE UP (ref 135–145)
SP GR SPEC: 1.01 — SIGNIFICANT CHANGE UP (ref 1–1.04)
SQUAMOUS # UR AUTO: SIGNIFICANT CHANGE UP
UROBILINOGEN FLD QL: NORMAL — SIGNIFICANT CHANGE UP
WBC # BLD: 11.27 K/UL — HIGH (ref 3.8–10.5)
WBC # FLD AUTO: 11.27 K/UL — HIGH (ref 3.8–10.5)
WBC UR QL: SIGNIFICANT CHANGE UP (ref 0–?)

## 2020-06-21 RX ORDER — FUROSEMIDE 40 MG
80 TABLET ORAL
Refills: 0 | Status: DISCONTINUED | OUTPATIENT
Start: 2020-06-21 | End: 2020-06-22

## 2020-06-21 RX ORDER — ACETAMINOPHEN 500 MG
650 TABLET ORAL EVERY 6 HOURS
Refills: 0 | Status: DISCONTINUED | OUTPATIENT
Start: 2020-06-21 | End: 2020-06-23

## 2020-06-21 RX ORDER — FUROSEMIDE 40 MG
80 TABLET ORAL ONCE
Refills: 0 | Status: COMPLETED | OUTPATIENT
Start: 2020-06-21 | End: 2020-06-21

## 2020-06-21 RX ADMIN — HEPARIN SODIUM 5000 UNIT(S): 5000 INJECTION INTRAVENOUS; SUBCUTANEOUS at 22:43

## 2020-06-21 RX ADMIN — SEVELAMER CARBONATE 1600 MILLIGRAM(S): 2400 POWDER, FOR SUSPENSION ORAL at 17:02

## 2020-06-21 RX ADMIN — Medication 650 MILLIGRAM(S): at 17:02

## 2020-06-21 RX ADMIN — SEVELAMER CARBONATE 1600 MILLIGRAM(S): 2400 POWDER, FOR SUSPENSION ORAL at 12:30

## 2020-06-21 RX ADMIN — Medication 650 MILLIGRAM(S): at 06:46

## 2020-06-21 RX ADMIN — HEPARIN SODIUM 5000 UNIT(S): 5000 INJECTION INTRAVENOUS; SUBCUTANEOUS at 06:02

## 2020-06-21 RX ADMIN — SEVELAMER CARBONATE 1600 MILLIGRAM(S): 2400 POWDER, FOR SUSPENSION ORAL at 10:26

## 2020-06-21 RX ADMIN — Medication 80 MILLIGRAM(S): at 22:43

## 2020-06-21 RX ADMIN — ATORVASTATIN CALCIUM 40 MILLIGRAM(S): 80 TABLET, FILM COATED ORAL at 22:42

## 2020-06-21 RX ADMIN — HEPARIN SODIUM 5000 UNIT(S): 5000 INJECTION INTRAVENOUS; SUBCUTANEOUS at 15:38

## 2020-06-21 RX ADMIN — Medication 80 MILLIGRAM(S): at 12:29

## 2020-06-21 RX ADMIN — Medication 80 MILLIGRAM(S): at 01:00

## 2020-06-21 RX ADMIN — PANTOPRAZOLE SODIUM 40 MILLIGRAM(S): 20 TABLET, DELAYED RELEASE ORAL at 06:01

## 2020-06-21 RX ADMIN — Medication 650 MILLIGRAM(S): at 06:01

## 2020-06-21 RX ADMIN — Medication 1: at 17:01

## 2020-06-21 RX ADMIN — Medication 50 MICROGRAM(S): at 06:01

## 2020-06-21 RX ADMIN — AMLODIPINE BESYLATE 10 MILLIGRAM(S): 2.5 TABLET ORAL at 06:01

## 2020-06-21 RX ADMIN — BISOPROLOL FUMARATE 5 MILLIGRAM(S): 10 TABLET, FILM COATED ORAL at 22:54

## 2020-06-21 RX ADMIN — BISOPROLOL FUMARATE 5 MILLIGRAM(S): 10 TABLET, FILM COATED ORAL at 02:40

## 2020-06-21 NOTE — PROGRESS NOTE ADULT - ASSESSMENT
ESRD: S/P PD catheter 4 days ago. Not on PD as yet. She is clinically euvolemic at this time.  Became dyspneic this am.  - Lasix 80 mg IVP BID.  - I will not start HD now.  - Follow up BMP.      Vomiting: Unlikely due to uremia. She feels better not. Possible due to gastroparesis. She may have gotten a narcotic for pain but thet is unclear.  - Hold HD.  - Odansetron PRN.  - Follow up BMP.    Leukocytosis:  No fever or any other findings of SIRS.  - Monitor.  - Catheter flush.

## 2020-06-21 NOTE — PROVIDER CONTACT NOTE (OTHER) - BACKGROUND
Patient admitted with end stage renal disease, patient had fluid overload, dyspneic and shortness of breath.

## 2020-06-21 NOTE — PROVIDER CONTACT NOTE (OTHER) - BACKGROUND
Patient admitted with end stage renal disease, fluid overload patient had shortness of breath and was dyspneic.

## 2020-06-21 NOTE — PROGRESS NOTE ADULT - PROBLEM SELECTOR PLAN 3
usually self limited, with worse symptom upon presentation.  possibly due to uremia vs gastroparesis.   no concerning GI symptoms on exam.   CT a/p unremarkable.   clinical monitor.   zofran prn.   outpatient gastric emptying study.

## 2020-06-21 NOTE — PROGRESS NOTE ADULT - SUBJECTIVE AND OBJECTIVE BOX
CELIO DON  70y  Patient is a 70y old  Female who presents with a chief complaint of SOB (2020 19:32)    HPI:  Seen and examined. She was dyspneic again this am. Not dyspneic at present.     HEALTH ISSUES - PROBLEM Dx:  Prophylactic measure: Prophylactic measure  Thyroid disease: Thyroid disease  Essential hypertension: Essential hypertension  Type 2 diabetes mellitus without complication, without long-term current use of insulin: Type 2 diabetes mellitus without complication, without long-term current use of insulin  ESRD (end stage renal disease): ESRD (end stage renal disease)  Non-intractable vomiting with nausea: Non-intractable vomiting with nausea  Other hypervolemia: Other hypervolemia        MEDICATIONS  (STANDING):  amLODIPine   Tablet 10 milliGRAM(s) Oral daily  atorvastatin 40 milliGRAM(s) Oral at bedtime  bisoprolol   Tablet 5 milliGRAM(s) Oral at bedtime  dextrose 5%. 1000 milliLiter(s) (50 mL/Hr) IV Continuous <Continuous>  dextrose 50% Injectable 12.5 Gram(s) IV Push once  dextrose 50% Injectable 25 Gram(s) IV Push once  dextrose 50% Injectable 25 Gram(s) IV Push once  furosemide   Injectable 80 milliGRAM(s) IV Push once  heparin   Injectable 5000 Unit(s) SubCutaneous every 8 hours  insulin lispro (HumaLOG) corrective regimen sliding scale   SubCutaneous three times a day before meals  insulin lispro (HumaLOG) corrective regimen sliding scale   SubCutaneous at bedtime  levothyroxine 50 MICROGram(s) Oral daily  pantoprazole    Tablet 40 milliGRAM(s) Oral before breakfast  sevelamer carbonate 1600 milliGRAM(s) Oral three times a day with meals  sodium bicarbonate 650 milliGRAM(s) Oral two times a day    MEDICATIONS  (PRN):  acetaminophen   Tablet .. 650 milliGRAM(s) Oral every 6 hours PRN Temp greater or equal to 38.5C (101.3F), Mild Pain (1 - 3), Moderate Pain (4 - 6)  dextrose 40% Gel 15 Gram(s) Oral once PRN Blood Glucose LESS THAN 70 milliGRAM(s)/deciliter  glucagon  Injectable 1 milliGRAM(s) IntraMuscular once PRN Glucose LESS THAN 70 milligrams/deciliter  ondansetron Injectable 4 milliGRAM(s) IV Push every 6 hours PRN Nausea    Vital Signs Last 24 Hrs  T(C): 36.9 (2020 09:45), Max: 38.1 (2020 06:42)  T(F): 98.4 (2020 09:45), Max: 100.5 (2020 06:42)  HR: 75 (2020 05:58) (72 - 82)  BP: 139/69 (2020 05:58) (139/69 - 186/88)  BP(mean): --  RR: 18 (2020 05:58) (16 - 26)  SpO2: 93% (2020 09:45) (89% - 100%)  Daily Height in cm: 154.94 (2020 12:41)    Daily     PHYSICAL EXAM:  Constitutional:  She appears comfortable and not distressed. Not diaphoretic.    Neck:  The thyroid is normal. Trachea is midline.     Respiratory: The lungs show bibasal creps.    Cardiovascular: S1 and S2 are normal. No mummurs, rubs or gallops are present.    Gastrointestinal: The abdomen is soft. No tenderness is present. No masses are present. Bowel sounds are normal.    Genitourinary: The bladder is not distended. No CVA tenderness is present.    Extremities: No edema is noted. No deformities are present.    Neurological: Cognition is normal. Tone, power and sensation are normal.     Skin: No lesions are seen  or palpated.                            7.9    11.27 )-----------( 180      ( 2020 05:57 )             24.8     06-21    138  |  101  |  60<H>  ----------------------------<  113<H>  4.6   |  22  |  6.65<H>    Ca    7.8<L>      2020 05:57  Phos  5.4     06-21  Mg     1.6         TPro  6.5  /  Alb  3.4  /  TBili  0.4  /  DBili  x   /  AST  10  /  ALT  < 4<L>  /  AlkPhos  85  06-21    Urinalysis Basic - ( 2020 10:15 )    Color: LIGHT YELLOW / Appearance: CLEAR / S.012 / pH: 6.5  Gluc: 50 / Ketone: NEGATIVE  / Bili: NEGATIVE / Urobili: NORMAL   Blood: SMALL / Protein: 600 / Nitrite: NEGATIVE   Leuk Esterase: NEGATIVE / RBC: 3-5 / WBC 0-2   Sq Epi: OCC / Non Sq Epi: x / Bacteria: NEGATIVE

## 2020-06-21 NOTE — PROGRESS NOTE ADULT - SUBJECTIVE AND OBJECTIVE BOX
SOB @ rest last night  Received a dose of lasix 80 mg IVP around 1 AM  Received another dose of lasix after 12 PM today  Breathing has improved  There is no chest pain    Vital Signs Last 24 Hrs  T(C): 36.9 (2020 09:45), Max: 38.1 (2020 06:42)  T(F): 98.4 (2020 09:45), Max: 100.5 (2020 06:42)  HR: 75 (2020 05:58) (75 - 82)  BP: 139/69 (2020 05:58) (139/69 - 186/88)  BP(mean): --  RR: 18 (2020 05:58) (18 - 26)  SpO2: 93% (2020 09:45) (89% - 98%)    I&O's Summary    20 @ 07:01  -  20 @ 07:00  --------------------------------------------------------  IN: 500 mL / OUT: 400 mL / NET: 100 mL    20 @ 07:01  -  20 @ 13:56  --------------------------------------------------------  IN: 200 mL / OUT: 200 mL / NET: 0 mL        GENERAL: no apparent distress, on room air  HEAD:  Atraumatic, Normocephalic  EYES: EOMI, PERRLA, conjunctiva and sclera clear b/l  NECK: No cervical lymphadenopathy; no obvious masses.   CHEST/LUNG: Clear to auscultation bilaterally; No wheezing or crackles  HEART: Regular rate and rhythm; No obvious murmurs; nl S1/S2; No peripheral edema  ABDOMEN: Soft, Nontender, Nondistended; Bowel sounds present; RLQ PD cath in place, surgical site is clean and w/o erythema.   EXTREMITIES:  2+ Peripheral Pulses; No joint swelling.  PSYCH: normal affect, calm demeanor  NEUROLOGY: Awake and alert; CN 2-12 grossly intact; no obvious FND  SKIN: No rashes or lesions    LABS:                        7.9    11.27 )-----------( 180      ( 2020 05:57 )             24.8     06-21    138  |  101  |  60<H>  ----------------------------<  113<H>  4.6   |  22  |  6.65<H>    Ca    7.8<L>      2020 05:57  Phos  5.4     -  Mg     1.6         TPro  6.5  /  Alb  3.4  /  TBili  0.4  /  DBili  x   /  AST  10  /  ALT  < 4<L>  /  AlkPhos  85  -21    PT/INR - ( 2020 13:25 )   PT: 12.5 SEC;   INR: 1.08          PTT - ( 2020 13:25 )  PTT:37.5 SEC  CAPILLARY BLOOD GLUCOSE      POCT Blood Glucose.: 137 mg/dL (2020 12:28)  POCT Blood Glucose.: 124 mg/dL (2020 08:40)  POCT Blood Glucose.: 188 mg/dL (2020 23:11)        Urinalysis Basic - ( 2020 10:15 )    Color: LIGHT YELLOW / Appearance: CLEAR / S.012 / pH: 6.5  Gluc: 50 / Ketone: NEGATIVE  / Bili: NEGATIVE / Urobili: NORMAL   Blood: SMALL / Protein: 600 / Nitrite: NEGATIVE   Leuk Esterase: NEGATIVE / RBC: 3-5 / WBC 0-2   Sq Epi: OCC / Non Sq Epi: x / Bacteria: NEGATIVE        RADIOLOGY & ADDITIONAL TESTS:    Imaging Personally Reviewed:  [x] YES  [ ] NO    Consultant(s) Notes Reviewed:  [x] YES  [ ] NO

## 2020-06-21 NOTE — PROGRESS NOTE ADULT - ASSESSMENT
70F with HTN, T2DM, ESRD not on dialysis yet presented with GI symptoms, which resolved on presentation. Her GI symptoms are possible related to uremia vs underline gastroparesis from long standing DM. She developed acute respiratory failure with hypoxemia 2' pulmonary edema.

## 2020-06-21 NOTE — PROVIDER CONTACT NOTE (OTHER) - BACKGROUND
Patient admitted with end stage renal disease, fluid overload experiencing shortness of breath and dyspnea.

## 2020-06-22 ENCOUNTER — TRANSCRIPTION ENCOUNTER (OUTPATIENT)
Age: 70
End: 2020-06-22

## 2020-06-22 LAB
ALBUMIN SERPL ELPH-MCNC: 4.1 G/DL — SIGNIFICANT CHANGE UP (ref 3.3–5)
ALP SERPL-CCNC: 103 U/L — SIGNIFICANT CHANGE UP (ref 40–120)
ALT FLD-CCNC: < 5 U/L — SIGNIFICANT CHANGE UP (ref 4–33)
ANION GAP SERPL CALC-SCNC: 18 MMO/L — HIGH (ref 7–14)
AST SERPL-CCNC: 8 U/L — SIGNIFICANT CHANGE UP (ref 4–32)
BASOPHILS # BLD AUTO: 0.05 K/UL — SIGNIFICANT CHANGE UP (ref 0–0.2)
BASOPHILS NFR BLD AUTO: 0.4 % — SIGNIFICANT CHANGE UP (ref 0–2)
BILIRUB SERPL-MCNC: 0.4 MG/DL — SIGNIFICANT CHANGE UP (ref 0.2–1.2)
BUN SERPL-MCNC: 66 MG/DL — HIGH (ref 7–23)
CALCIUM SERPL-MCNC: 8.4 MG/DL — SIGNIFICANT CHANGE UP (ref 8.4–10.5)
CHLORIDE SERPL-SCNC: 96 MMOL/L — LOW (ref 98–107)
CO2 SERPL-SCNC: 21 MMOL/L — LOW (ref 22–31)
CREAT SERPL-MCNC: 7.36 MG/DL — HIGH (ref 0.5–1.3)
EOSINOPHIL # BLD AUTO: 0.19 K/UL — SIGNIFICANT CHANGE UP (ref 0–0.5)
EOSINOPHIL NFR BLD AUTO: 1.6 % — SIGNIFICANT CHANGE UP (ref 0–6)
GLUCOSE BLDC GLUCOMTR-MCNC: 170 MG/DL — HIGH (ref 70–99)
GLUCOSE BLDC GLUCOMTR-MCNC: 184 MG/DL — HIGH (ref 70–99)
GLUCOSE BLDC GLUCOMTR-MCNC: 191 MG/DL — HIGH (ref 70–99)
GLUCOSE BLDC GLUCOMTR-MCNC: 194 MG/DL — HIGH (ref 70–99)
GLUCOSE BLDC GLUCOMTR-MCNC: 224 MG/DL — HIGH (ref 70–99)
GLUCOSE SERPL-MCNC: 155 MG/DL — HIGH (ref 70–99)
HCT VFR BLD CALC: 26.9 % — LOW (ref 34.5–45)
HGB BLD-MCNC: 8.9 G/DL — LOW (ref 11.5–15.5)
IMM GRANULOCYTES NFR BLD AUTO: 0.3 % — SIGNIFICANT CHANGE UP (ref 0–1.5)
LYMPHOCYTES # BLD AUTO: 17.8 % — SIGNIFICANT CHANGE UP (ref 13–44)
LYMPHOCYTES # BLD AUTO: 2.13 K/UL — SIGNIFICANT CHANGE UP (ref 1–3.3)
MAGNESIUM SERPL-MCNC: 1.8 MG/DL — SIGNIFICANT CHANGE UP (ref 1.6–2.6)
MCHC RBC-ENTMCNC: 29.6 PG — SIGNIFICANT CHANGE UP (ref 27–34)
MCHC RBC-ENTMCNC: 33.1 % — SIGNIFICANT CHANGE UP (ref 32–36)
MCV RBC AUTO: 89.4 FL — SIGNIFICANT CHANGE UP (ref 80–100)
MONOCYTES # BLD AUTO: 0.79 K/UL — SIGNIFICANT CHANGE UP (ref 0–0.9)
MONOCYTES NFR BLD AUTO: 6.6 % — SIGNIFICANT CHANGE UP (ref 2–14)
NEUTROPHILS # BLD AUTO: 8.8 K/UL — HIGH (ref 1.8–7.4)
NEUTROPHILS NFR BLD AUTO: 73.3 % — SIGNIFICANT CHANGE UP (ref 43–77)
NRBC # FLD: 0 K/UL — SIGNIFICANT CHANGE UP (ref 0–0)
PHOSPHATE SERPL-MCNC: 5.4 MG/DL — HIGH (ref 2.5–4.5)
PLATELET # BLD AUTO: 191 K/UL — SIGNIFICANT CHANGE UP (ref 150–400)
PMV BLD: 13.4 FL — HIGH (ref 7–13)
POTASSIUM SERPL-MCNC: 4.4 MMOL/L — SIGNIFICANT CHANGE UP (ref 3.5–5.3)
POTASSIUM SERPL-SCNC: 4.4 MMOL/L — SIGNIFICANT CHANGE UP (ref 3.5–5.3)
PROT SERPL-MCNC: 7.5 G/DL — SIGNIFICANT CHANGE UP (ref 6–8.3)
RBC # BLD: 3.01 M/UL — LOW (ref 3.8–5.2)
RBC # FLD: 12.5 % — SIGNIFICANT CHANGE UP (ref 10.3–14.5)
SODIUM SERPL-SCNC: 135 MMOL/L — SIGNIFICANT CHANGE UP (ref 135–145)
WBC # BLD: 12 K/UL — HIGH (ref 3.8–10.5)
WBC # FLD AUTO: 12 K/UL — HIGH (ref 3.8–10.5)

## 2020-06-22 RX ORDER — CHLORHEXIDINE GLUCONATE 213 G/1000ML
1 SOLUTION TOPICAL DAILY
Refills: 0 | Status: DISCONTINUED | OUTPATIENT
Start: 2020-06-22 | End: 2020-06-23

## 2020-06-22 RX ORDER — FUROSEMIDE 40 MG
80 TABLET ORAL DAILY
Refills: 0 | Status: DISCONTINUED | OUTPATIENT
Start: 2020-06-23 | End: 2020-06-23

## 2020-06-22 RX ADMIN — HEPARIN SODIUM 5000 UNIT(S): 5000 INJECTION INTRAVENOUS; SUBCUTANEOUS at 12:52

## 2020-06-22 RX ADMIN — Medication 50 MICROGRAM(S): at 05:04

## 2020-06-22 RX ADMIN — SEVELAMER CARBONATE 1600 MILLIGRAM(S): 2400 POWDER, FOR SUSPENSION ORAL at 17:41

## 2020-06-22 RX ADMIN — Medication 1: at 12:43

## 2020-06-22 RX ADMIN — Medication 80 MILLIGRAM(S): at 05:04

## 2020-06-22 RX ADMIN — ATORVASTATIN CALCIUM 40 MILLIGRAM(S): 80 TABLET, FILM COATED ORAL at 21:11

## 2020-06-22 RX ADMIN — SEVELAMER CARBONATE 1600 MILLIGRAM(S): 2400 POWDER, FOR SUSPENSION ORAL at 09:20

## 2020-06-22 RX ADMIN — Medication 650 MILLIGRAM(S): at 17:41

## 2020-06-22 RX ADMIN — BISOPROLOL FUMARATE 5 MILLIGRAM(S): 10 TABLET, FILM COATED ORAL at 21:12

## 2020-06-22 RX ADMIN — CHLORHEXIDINE GLUCONATE 1 APPLICATION(S): 213 SOLUTION TOPICAL at 12:43

## 2020-06-22 RX ADMIN — HEPARIN SODIUM 5000 UNIT(S): 5000 INJECTION INTRAVENOUS; SUBCUTANEOUS at 05:03

## 2020-06-22 RX ADMIN — PANTOPRAZOLE SODIUM 40 MILLIGRAM(S): 20 TABLET, DELAYED RELEASE ORAL at 06:02

## 2020-06-22 RX ADMIN — AMLODIPINE BESYLATE 10 MILLIGRAM(S): 2.5 TABLET ORAL at 05:04

## 2020-06-22 RX ADMIN — SEVELAMER CARBONATE 1600 MILLIGRAM(S): 2400 POWDER, FOR SUSPENSION ORAL at 12:52

## 2020-06-22 RX ADMIN — HEPARIN SODIUM 5000 UNIT(S): 5000 INJECTION INTRAVENOUS; SUBCUTANEOUS at 21:11

## 2020-06-22 RX ADMIN — Medication 650 MILLIGRAM(S): at 05:05

## 2020-06-22 RX ADMIN — Medication 2: at 09:19

## 2020-06-22 RX ADMIN — Medication 1: at 17:49

## 2020-06-22 NOTE — PROGRESS NOTE ADULT - ASSESSMENT
70F with PMH HTN, T2DM, ESRD with recently placed PD cath, not started on HD yet, presented with nausea and vomiting.    ESRD: S/P PD catheter 4 days ago. Not on PD as yet.   present with vomiting now better, not likely uremic  no indication for HD right now  started on lasix 80 iv bid for dyspnea, symptoms better can be dc with lasix 80 po daily  outpatient follow up for staring PD when ready    stable from renal stand point for discharge.     Vomiting: Unlikely due to uremia. She feels better now. Possible due to gastroparesis. She may have gotten a narcotic for pain but thet is unclear.  - symptoms better  - Odansetron PRN.  - Follow up BMP.    Leukocytosis:  No fever or any other findings of SIRS.  - Monitor.  - Catheter flush.    Hyperphosphatemia  continue renagel  monitor    Acidosis  continue bicarb  monitor 70F with PMH HTN, T2DM, ESRD with recently placed PD cath, not started on HD yet, presented with nausea and vomiting.    ESRD: S/P PD catheter placement 4 days ago. Not initiated on PD as yet.   presented with vomiting now improved, not likely uremic  no indication to initiate HD right now.   Pt started on lasix 80mg iv bid for dyspnea over the weekend. Symptoms Improved. Pt can be dc with lasix 80 po daily  Pt to follow up outpatient for PD initiation     stable from renal stand point for discharge.     Vomiting: Unlikely due to uremia. She feels better now. Possible due to gastroparesis. She may have gotten a narcotic for pain but that is unclear.  - symptoms resolved  - Odansetron PRN.  - Follow up BMP.    Leukocytosis:  No fever or any other findings of SIRS.  - Monitor.  - Catheter flush.    Hyperphosphatemia  continue renagel  monitor    Acidosis  continue bicarb  monitor

## 2020-06-22 NOTE — PROGRESS NOTE ADULT - PROBLEM SELECTOR PLAN 2
100.5 temp on 6/21, has not recurred since  CXR, CT A/P, UA and COVID-10 are (-)  Blood cxs 6/21 (-) so far

## 2020-06-22 NOTE — DISCHARGE NOTE PROVIDER - NSDCMRMEDTOKEN_GEN_ALL_CORE_FT
amLODIPine 10 mg oral tablet: 1 tab(s) orally once a day  atorvastatin 40 mg oral tablet: 1 tab(s) orally once a day  bisoprolol 5 mg oral tablet: 1 tab(s) orally once a day (at bedtime)  Januvia 100 mg oral tablet: 1 tab(s) orally once a day  levothyroxine 50 mcg (0.05 mg) oral capsule: 1 cap(s) orally once a day  omeprazole 40 mg oral delayed release capsule: 1 cap(s) orally once a day  sevelamer hydrochloride 800 mg oral tablet: 2 tab(s) orally 3 times a day  sodium bicarbonate 650 mg oral tablet: 1 tab(s) orally 2 times a day  Vitamin D2 50,000 intl units (1.25 mg) oral capsule: 1 cap(s) orally once a week amLODIPine 10 mg oral tablet: 1 tab(s) orally once a day  atorvastatin 40 mg oral tablet: 1 tab(s) orally once a day  bisoprolol 5 mg oral tablet: 1 tab(s) orally once a day (at bedtime)  FeroSul 325 mg (65 mg elemental iron) oral tablet: 1 tab(s) orally once a day   furosemide 80 mg oral tablet: 1 tab(s) orally once a day  Januvia 100 mg oral tablet: 1 tab(s) orally once a day  levothyroxine 50 mcg (0.05 mg) oral capsule: 1 cap(s) orally once a day  omeprazole 40 mg oral delayed release capsule: 1 cap(s) orally once a day  ondansetron 4 mg oral tablet, disintegratin tab(s) orally every 8 hours as needed for nausea/vomiting   MDD:3 tabs  sevelamer hydrochloride 800 mg oral tablet: 2 tab(s) orally 3 times a day  sodium bicarbonate 650 mg oral tablet: 1 tab(s) orally 2 times a day  Vitamin D2 50,000 intl units (1.25 mg) oral capsule: 1 cap(s) orally once a week

## 2020-06-22 NOTE — PROGRESS NOTE ADULT - SUBJECTIVE AND OBJECTIVE BOX
[facility] [place] [facility]   MD ANKIT CAMPOS DO ANAM SIDDIQUI ANGELA WONG, PA    TEL:  OFFICE: [phone]  DR HOWARD CELL: [phone]  DR. LEVY CELL: [phone]  DR. DÍAZ CELL: [phone]  BEN GILBERT CELL: [phone]    From 5pm-7am Answering Service [phone]    Patient is a 70y old  Female who presents with a chief complaint of SOB (21 Jun 2020 13:55)      Patient seen and examined at bedside. No chest pain/sob, no n/v. have a good appetite right now. Kazakh  used ID # [id number]    VITALS:  T(F): 98.3 (06-22-20 @ 04:59), Max: 99.2 (06-21-20 @ 22:39)  HR: 73 (06-22-20 @ 04:59)  BP: 150/75 (06-22-20 @ 04:59)  RR: 18 (06-22-20 @ 04:59)  SpO2: 94% (06-22-20 @ 04:59)  Wt(kg): --    06-21 @ 07:01  -  06-22 @ 07:00  --------------------------------------------------------  IN: 440 mL / OUT: 500 mL / NET: -60 mL          PHYSICAL EXAM:  Constitutional: NAD  Neck: No JVD  Respiratory: basilar crackles  Cardiovascular: S1, S2, RRR  Gastrointestinal: BS+, soft, NT/ND  Extremities: No peripheral edema    Hospital Medications:   MEDICATIONS  (STANDING):  amLODIPine   Tablet 10 milliGRAM(s) Oral daily  atorvastatin 40 milliGRAM(s) Oral at bedtime  bisoprolol   Tablet 5 milliGRAM(s) Oral at bedtime  chlorhexidine 4% Liquid 1 Application(s) Topical daily  dextrose 5%. 1000 milliLiter(s) (50 mL/Hr) IV Continuous <Continuous>  dextrose 50% Injectable 12.5 Gram(s) IV Push once  dextrose 50% Injectable 25 Gram(s) IV Push once  dextrose 50% Injectable 25 Gram(s) IV Push once  furosemide   Injectable 80 milliGRAM(s) IV Push two times a day  heparin   Injectable 5000 Unit(s) SubCutaneous every 8 hours  insulin lispro (HumaLOG) corrective regimen sliding scale   SubCutaneous three times a day before meals  insulin lispro (HumaLOG) corrective regimen sliding scale   SubCutaneous at bedtime  levothyroxine 50 MICROGram(s) Oral daily  pantoprazole    Tablet 40 milliGRAM(s) Oral before breakfast  sevelamer carbonate 1600 milliGRAM(s) Oral three times a day with meals  sodium bicarbonate 650 milliGRAM(s) Oral two times a day      LABS:  06-22    135  |  96<L>  |  66<H>  ----------------------------<  155<H>  4.4   |  21<L>  |  7.36<H>    Ca    8.4      22 Jun 2020 05:28  Phos  5.4     06-22  Mg     1.8     06-22    TPro  7.5  /  Alb  4.1  /  TBili  0.4  /  DBili      /  AST  8   /  ALT  < 5  /  AlkPhos  103  06-22    Creatinine Trend: 7.36 <--, 6.65 <--, 6.68 <--    Albumin, Serum: 4.1 g/dL (06-22 @ 05:28)  Phosphorus Level, Serum: 5.4 mg/dL (06-22 @ 05:28)                              8.9    12.00 )-----------( 191      ( 22 Jun 2020 05:28 )             26.9     Urine Studies:  Urinalysis - [06-21-20 @ 10:15]      Color LIGHT YELLOW / Appearance CLEAR / SG 1.012 / pH 6.5      Gluc 50 / Ketone NEGATIVE  / Bili NEGATIVE / Urobili NORMAL       Blood SMALL / Protein 600 / Leuk Est NEGATIVE / Nitrite NEGATIVE      RBC 3-5 / WBC 0-2 / Hyaline NEGATIVE / Gran  / Sq Epi OCC / Non Sq Epi  / Bacteria NEGATIVE        HBsAg NEGATIVE      [06-20-20 @ 20:33]  HCV 0.08, Nonreactive Hepatitis C AB  S/CO Ratio                        Interpretation  < 1.00                                   Non-Reactive  1.00 - 4.99                         Weakly-Reactive  >= 5.00                                Reactive  Non-Reactive: Aperson with a non-reactive HCV antibody  result is considered uninfected.  No further action is  needed unless recent infection is suspected.  In these  cases, consider repeat testing later to detect  seroconversion..  Weakly-Reactive: HCV antibody test is abnormal, HCV RNA  Qualitative test will follow.  Reactive: HCV antibody test is abnormal, HCV RNA  Qualitative test will follow.  Note: HCV antibody testing is performed on the [employer]   system.      [06-21-20 @ 05:57]      RADIOLOGY & ADDITIONAL STUDIES:

## 2020-06-22 NOTE — DISCHARGE NOTE PROVIDER - CARE PROVIDER_API CALL
[facility]  [id number]  [address] [place] [address]  Phone: [phone]  Fax: [phone]  Established Patient  Follow Up Time: 1 week    Moises Harp  INTERNAL MEDICINE  [address] [place] [address]  Phone: [phone]  Fax: [phone]  Established Patient  Follow Up Time: 1 week

## 2020-06-22 NOTE — DISCHARGE NOTE PROVIDER - NSDCFUADDINST_GEN_ALL_CORE_FT
If you develop chest pain, shortness breath, or palpitations, go to your nearest ER. If you develop chest pain, shortness breath, or palpitations, go to your nearest ER.  Call and schedule follow up with your primary care physician and with your nephrologist within 1 week.

## 2020-06-22 NOTE — PROGRESS NOTE ADULT - SUBJECTIVE AND OBJECTIVE BOX
Breathing comfortably  94% on 2LNCO2    Vital Signs Last 24 Hrs  T(C): 36.8 (2020 04:59), Max: 37.3 (2020 22:39)  T(F): 98.3 (2020 04:59), Max: 99.2 (2020 22:39)  HR: 73 (2020 04:59) (73 - 74)  BP: 150/75 (2020 04:59) (140/63 - 150/75)  BP(mean): --  RR: 18 (2020 04:59) (18 - 18)  SpO2: 94% (2020 04:59) (94% - 95%)    I&O's Summary    20 @ 07:01  -  20 @ 07:00  --------------------------------------------------------  IN: 440 mL / OUT: 500 mL / NET: -60 mL        GENERAL: no apparent distress, on room air  HEAD:  Atraumatic, Normocephalic  EYES: EOMI, PERRLA, conjunctiva and sclera clear b/l  NECK: No cervical lymphadenopathy; no obvious masses.   CHEST/LUNG: Clear to auscultation bilaterally; No wheezing or crackles  HEART: Regular rate and rhythm; No obvious murmurs; nl S1/S2; No peripheral edema  ABDOMEN: Soft, Nontender, Nondistended; Bowel sounds present; RLQ PD cath in place, surgical site is clean and w/o erythema.   EXTREMITIES:  2+ Peripheral Pulses; No joint swelling.  PSYCH: normal affect, calm demeanor  NEUROLOGY: Awake and alert; CN 2-12 grossly intact; no obvious FND  SKIN: No rashes or lesions    LABS:                        8.9    12.00 )-----------( 191      ( 2020 05:28 )             26.9     06-22    135  |  96<L>  |  66<H>  ----------------------------<  155<H>  4.4   |  21<L>  |  7.36<H>    Ca    8.4      2020 05:28  Phos  5.4     -  Mg     1.8     -    TPro  7.5  /  Alb  4.1  /  TBili  0.4  /  DBili  x   /  AST  8   /  ALT  < 5  /  AlkPhos  103  -      CAPILLARY BLOOD GLUCOSE      POCT Blood Glucose.: 191 mg/dL (2020 11:59)  POCT Blood Glucose.: 224 mg/dL (2020 09:18)  POCT Blood Glucose.: 184 mg/dL (2020 08:12)  POCT Blood Glucose.: 141 mg/dL (2020 20:23)  POCT Blood Glucose.: 183 mg/dL (2020 16:52)        Urinalysis Basic - ( 2020 10:15 )    Color: LIGHT YELLOW / Appearance: CLEAR / S.012 / pH: 6.5  Gluc: 50 / Ketone: NEGATIVE  / Bili: NEGATIVE / Urobili: NORMAL   Blood: SMALL / Protein: 600 / Nitrite: NEGATIVE   Leuk Esterase: NEGATIVE / RBC: 3-5 / WBC 0-2   Sq Epi: OCC / Non Sq Epi: x / Bacteria: NEGATIVE        RADIOLOGY & ADDITIONAL TESTS:    Imaging Personally Reviewed:  [x] YES  [ ] NO    Consultant(s) Notes Reviewed:  [x] YES  [ ] NO

## 2020-06-22 NOTE — DISCHARGE NOTE PROVIDER - NSDCCPCAREPLAN_GEN_ALL_CORE_FT
PRINCIPAL DISCHARGE DIAGNOSIS  Diagnosis: ESRD (end stage renal disease)  Assessment and Plan of Treatment: Monitor your renal function carefully and prevent worsening renal function. Avoid NSAIDs and nephrotoxic agents (that may negatively affect your kidneys). Please follow up with your nephorologist for  peritoneal dialysis scheduling.      SECONDARY DISCHARGE DIAGNOSES  Diagnosis: Diabetes mellitus  Assessment and Plan of Treatment: Please check your fingersticks every morning or if you are not feeling well.  If your fingerstick is >300 mg/dl x 3 or more readings, please contact your doctor. . If your fingerstick low, <70 mg/dl and/or you have symptoms of very low blood sugar, FIRST drink 1/2 cup of apple juice, (or take 4 glucose tabs/tube of glucose gel) and recheck fingerstick in 15 minutes.  Repeat these steps until blood sugar is above 100 mg/dl, IF NECESSARY.  Then call provider your doctor to discuss low blood sugar. Continue to take your medications as needed. PRINCIPAL DISCHARGE DIAGNOSIS  Diagnosis: ESRD (end stage renal disease)  Assessment and Plan of Treatment: Monitor your renal function carefully in an attempt to prevent worsening renal function.  Avoid NSAIDs and nephrotoxic agents (that may negatively affect your kidneys). Please call and schedule follow up with your nephorologist for  peritoneal dialysis scheduling.      SECONDARY DISCHARGE DIAGNOSES  Diagnosis: Anemia  Assessment and Plan of Treatment: Continue your medication ferrous sulfatre for your anemia as directed and please follow-up as an outpatient with your primary care provider , and nephrologist for further care and recommendations.    Diagnosis: Non-intractable vomiting with nausea  Assessment and Plan of Treatment: While you were hospitalized your nausea/vomiting improved, and were give oral nausea medication zofran which decreased your symptoms. Take the zofran as needed as directed. If symptoms continue call and schedule follow up appointment with GI physician for further Gastric emptying workup as outpatient    Diagnosis: COVID-19 virus not detected  Assessment and Plan of Treatment: COVID-19 virus not detected    Diagnosis: Thyroid disease  Assessment and Plan of Treatment: Continue your medications as directed and please follow-up as an outpatient with your primary care provider for further care and recommendations.    Diagnosis: Diabetes mellitus  Assessment and Plan of Treatment: Please check your fingersticks every morning or if you are not feeling well.  If your fingerstick is >300 mg/dl x 3 or more readings, please contact your doctor. . If your fingerstick low, <70 mg/dl and/or you have symptoms of very low blood sugar, FIRST drink 1/2 cup of apple juice, (or take 4 glucose tabs/tube of glucose gel) and recheck fingerstick in 15 minutes.  Repeat these steps until blood sugar is above 100 mg/dl, IF NECESSARY.  Then call provider your doctor to discuss low blood sugar. Continue to take your medications as needed.    Diagnosis: Acute respiratory failure with hypoxemia  Assessment and Plan of Treatment: While you were hospitalized you were given a water pill lasix to help improve your breathing and decrease your shortness of breath. Continue your medication lasix  as directed and please follow-up as an outpatient with your primary care provider for further care and recommendations.

## 2020-06-22 NOTE — PROGRESS NOTE ADULT - PROBLEM SELECTOR PLAN 1
2' acute pulmonary edema  Lasix 80 mg IV bid -> change to 80 mg PO daily 6/22/20  Check pulse oximetry reading on RA

## 2020-06-22 NOTE — DISCHARGE NOTE PROVIDER - NSDCFUSCHEDAPPT_GEN_ALL_CORE_FT
CELIO DON ; 07/15/2020 ; [facility] [address] CELIO [facility] ; 07/15/2020 ; [facility] [address] C[facility] [place] ; 07/15/2020 ; [facility] [address] CEL[facility] [place] ; 07/15/2020 ; [facility] [address]  CELIO DON ; 07/15/2020 ; [facility] [address] [facility] [place] ; 07/15/2020 ; [facility] [address] [facility] [place] ; 07/15/2020 ; [facility] [address] [facility] [place] ; 07/15/2020 ; [facility] [address]

## 2020-06-22 NOTE — DISCHARGE NOTE PROVIDER - HOSPITAL COURSE
70F with PMH HTN, T2DM, ESRD with recently placed PD cath, not started on HD yet, presented with nausea and vomiting. Patient's son provided translation at bedside. He reports patient been experiencing morning nausea and vomiting, usually once a day in the morning. This morning she had 4 episodes of NBNB vomiting. Denied any associated abdominal pain or diarrhea. The son was concern, thus called patient's nephrologist, who instructed patient to come to ED. Patient's vomiting had resolved at time of arrival. Patient denied any CP or SOB during this time.  Her GI symptoms are possible related to uremia vs underline gastroparesis from long standing DM. She developed acute respiratory failure with hypoxemia 2/2 pulmonary edema.        1.Acute respiratory failure with hypoxemia 2/2 acute pulmonary edema    - diuresed with Lasix 80 mg IV bid, change to po daily prior to DC    -Titrate O2 sats to above 92%.         2. Fever; 100.5 temp on 6/21    CXR, CT A/P, UA and COVID-10 are (-)    Blood cxs 6/21 no growth        3. Non-intractable vomiting with nausea possibly due to uremia vs gastroparesis.     no concerning GI symptoms on exam.     CT a/p unremarkable.     clinical monitor.     zofran prn.     outpatient gastric emptying study.         4. ESRD (end stage renal disease).  not on HD yet , s/p PD cath placement    nephro following    outpatient follow up for staring PD when ready            5. Type 2 diabetes mellitus without complication, without long-term current use of insulin; A1c = 6.3.      januvia held during hospital stay    cont low ISS, HS AC HS        6. Essential hypertension    c/w amlodipine    c/w Bisoprolol    hydralazine IVP if patient remain hypertensive.        7. Thyroid disease    c/w synthroid            Disccused with Dr. Pro , cleared for DC on 6/23/20 . Medications reviewed with patient. Medications sent to patient's preferred pharmacy. 70F with PMH HTN, T2DM, ESRD with recently placed PD cath, not started on HD yet, presented to [facility] with worsening nausea with NBNB vomiting, and shortness of breath likely secondary to ESRD. Covid PCR negative . CT AP with with trace bilateral pleural effusions, PD catheter in place, no ascites. Nephrology consulted and following. Intermittent nausea mainly in the morning improving with prn oral zofran. Acute respiratory failure with hypoxemia 2/2 to acute pulmonary edema improving after initiating IV lasix with diuresis. Tolerating diet. Patient has not started on dialysis as of yet. Afebrile, vital signs stable. IV lasix transitioned to daily oral lasix. Denies any chest pain, SOB, abdominal pain or diarrhea. Patient medically stable. Nephrology without any objection to discharging home. Son at the bedside, aware of discharge today.          Dispo: Patient case discussed with attending physician Dr. Pro who agrees the patient is medically stable and optimized for discharge home today 6/23, on oral lasix and prn po zofran with follow up with outpatient nephrology, primary care physician, and outpatient GI if nausea continues for further gastroparesis workup due to h/o longstanding DM, with nausea unlikely related to uremia. All medications were reviewed and prescriptions were sent to mutually agreed upon pharmacy.

## 2020-06-22 NOTE — DISCHARGE NOTE PROVIDER - CARE PROVIDERS DIRECT ADDRESSES
,DirectAddress_Unknown,[email or web address]

## 2020-06-22 NOTE — PROGRESS NOTE ADULT - PROBLEM SELECTOR PLAN 3
usually self limited, with worse symptom upon presentation.  possibly due to uremia vs gastroparesis.   no concerning GI symptoms on exam.   CT a/p unremarkable.   has not recurred  zofran prn.   outpatient gastric emptying study.

## 2020-06-22 NOTE — DISCHARGE NOTE PROVIDER - PROVIDER TOKENS
PROVIDER:[TOKEN:[[id number]],FOLLOWUP:[1 week],ESTABLISHEDPATIENT:[T]],PROVIDER:[TOKEN:[[id number]],FOLLOWUP:[1 week],ESTABLISHEDPATIENT:[T]]

## 2020-06-23 ENCOUNTER — TRANSCRIPTION ENCOUNTER (OUTPATIENT)
Age: 70
End: 2020-06-23

## 2020-06-23 VITALS
RESPIRATION RATE: 19 BRPM | HEART RATE: 71 BPM | SYSTOLIC BLOOD PRESSURE: 137 MMHG | OXYGEN SATURATION: 96 % | DIASTOLIC BLOOD PRESSURE: 69 MMHG | TEMPERATURE: 98 F

## 2020-06-23 LAB
ALBUMIN SERPL ELPH-MCNC: 3.9 G/DL — SIGNIFICANT CHANGE UP (ref 3.3–5)
ALP SERPL-CCNC: 90 U/L — SIGNIFICANT CHANGE UP (ref 40–120)
ALT FLD-CCNC: < 5 U/L — SIGNIFICANT CHANGE UP (ref 4–33)
ANION GAP SERPL CALC-SCNC: 18 MMO/L — HIGH (ref 7–14)
AST SERPL-CCNC: 9 U/L — SIGNIFICANT CHANGE UP (ref 4–32)
BASOPHILS # BLD AUTO: 0.04 K/UL — SIGNIFICANT CHANGE UP (ref 0–0.2)
BASOPHILS NFR BLD AUTO: 0.4 % — SIGNIFICANT CHANGE UP (ref 0–2)
BILIRUB SERPL-MCNC: 0.3 MG/DL — SIGNIFICANT CHANGE UP (ref 0.2–1.2)
BUN SERPL-MCNC: 65 MG/DL — HIGH (ref 7–23)
CALCIUM SERPL-MCNC: 8.2 MG/DL — LOW (ref 8.4–10.5)
CHLORIDE SERPL-SCNC: 94 MMOL/L — LOW (ref 98–107)
CO2 SERPL-SCNC: 22 MMOL/L — SIGNIFICANT CHANGE UP (ref 22–31)
CREAT SERPL-MCNC: 7.42 MG/DL — HIGH (ref 0.5–1.3)
EOSINOPHIL # BLD AUTO: 0.18 K/UL — SIGNIFICANT CHANGE UP (ref 0–0.5)
EOSINOPHIL NFR BLD AUTO: 1.7 % — SIGNIFICANT CHANGE UP (ref 0–6)
FERRITIN SERPL-MCNC: 360 NG/ML — HIGH (ref 15–150)
GLUCOSE BLDC GLUCOMTR-MCNC: 170 MG/DL — HIGH (ref 70–99)
GLUCOSE BLDC GLUCOMTR-MCNC: 228 MG/DL — HIGH (ref 70–99)
GLUCOSE SERPL-MCNC: 161 MG/DL — HIGH (ref 70–99)
HCT VFR BLD CALC: 23.9 % — LOW (ref 34.5–45)
HGB BLD-MCNC: 7.9 G/DL — LOW (ref 11.5–15.5)
IMM GRANULOCYTES NFR BLD AUTO: 0.4 % — SIGNIFICANT CHANGE UP (ref 0–1.5)
IRON SATN MFR SERPL: 200 UG/DL — SIGNIFICANT CHANGE UP (ref 140–530)
IRON SATN MFR SERPL: 30 UG/DL — SIGNIFICANT CHANGE UP (ref 30–160)
LYMPHOCYTES # BLD AUTO: 1.86 K/UL — SIGNIFICANT CHANGE UP (ref 1–3.3)
LYMPHOCYTES # BLD AUTO: 17.4 % — SIGNIFICANT CHANGE UP (ref 13–44)
MAGNESIUM SERPL-MCNC: 1.8 MG/DL — SIGNIFICANT CHANGE UP (ref 1.6–2.6)
MCHC RBC-ENTMCNC: 28.7 PG — SIGNIFICANT CHANGE UP (ref 27–34)
MCHC RBC-ENTMCNC: 33.1 % — SIGNIFICANT CHANGE UP (ref 32–36)
MCV RBC AUTO: 86.9 FL — SIGNIFICANT CHANGE UP (ref 80–100)
MONOCYTES # BLD AUTO: 0.75 K/UL — SIGNIFICANT CHANGE UP (ref 0–0.9)
MONOCYTES NFR BLD AUTO: 7 % — SIGNIFICANT CHANGE UP (ref 2–14)
NEUTROPHILS # BLD AUTO: 7.8 K/UL — HIGH (ref 1.8–7.4)
NEUTROPHILS NFR BLD AUTO: 73.1 % — SIGNIFICANT CHANGE UP (ref 43–77)
NRBC # FLD: 0 K/UL — SIGNIFICANT CHANGE UP (ref 0–0)
PHOSPHATE SERPL-MCNC: 5.6 MG/DL — HIGH (ref 2.5–4.5)
PLATELET # BLD AUTO: 187 K/UL — SIGNIFICANT CHANGE UP (ref 150–400)
PMV BLD: 13.4 FL — HIGH (ref 7–13)
POTASSIUM SERPL-MCNC: 3.9 MMOL/L — SIGNIFICANT CHANGE UP (ref 3.5–5.3)
POTASSIUM SERPL-SCNC: 3.9 MMOL/L — SIGNIFICANT CHANGE UP (ref 3.5–5.3)
PROT SERPL-MCNC: 7.6 G/DL — SIGNIFICANT CHANGE UP (ref 6–8.3)
RBC # BLD: 2.75 M/UL — LOW (ref 3.8–5.2)
RBC # FLD: 12.6 % — SIGNIFICANT CHANGE UP (ref 10.3–14.5)
SODIUM SERPL-SCNC: 134 MMOL/L — LOW (ref 135–145)
UIBC SERPL-MCNC: 170.3 UG/DL — SIGNIFICANT CHANGE UP (ref 110–370)
WBC # BLD: 10.67 K/UL — HIGH (ref 3.8–10.5)
WBC # FLD AUTO: 10.67 K/UL — HIGH (ref 3.8–10.5)

## 2020-06-23 RX ORDER — FERROUS SULFATE 325(65) MG
1 TABLET ORAL
Qty: 30 | Refills: 0
Start: 2020-06-23 | End: 2020-07-22

## 2020-06-23 RX ORDER — FUROSEMIDE 40 MG
1 TABLET ORAL
Qty: 30 | Refills: 0
Start: 2020-06-23 | End: 2020-07-22

## 2020-06-23 RX ORDER — FERROUS SULFATE 325(65) MG
325 TABLET ORAL DAILY
Refills: 0 | Status: DISCONTINUED | OUTPATIENT
Start: 2020-06-23 | End: 2020-06-23

## 2020-06-23 RX ORDER — ONDANSETRON 8 MG/1
1 TABLET, FILM COATED ORAL
Qty: 12 | Refills: 0
Start: 2020-06-23 | End: 2020-06-26

## 2020-06-23 RX ADMIN — Medication 80 MILLIGRAM(S): at 05:54

## 2020-06-23 RX ADMIN — SEVELAMER CARBONATE 1600 MILLIGRAM(S): 2400 POWDER, FOR SUSPENSION ORAL at 13:03

## 2020-06-23 RX ADMIN — Medication 1: at 08:46

## 2020-06-23 RX ADMIN — Medication 325 MILLIGRAM(S): at 13:04

## 2020-06-23 RX ADMIN — PANTOPRAZOLE SODIUM 40 MILLIGRAM(S): 20 TABLET, DELAYED RELEASE ORAL at 05:54

## 2020-06-23 RX ADMIN — CHLORHEXIDINE GLUCONATE 1 APPLICATION(S): 213 SOLUTION TOPICAL at 12:24

## 2020-06-23 RX ADMIN — ONDANSETRON 4 MILLIGRAM(S): 8 TABLET, FILM COATED ORAL at 07:42

## 2020-06-23 RX ADMIN — Medication 50 MICROGRAM(S): at 05:54

## 2020-06-23 RX ADMIN — SEVELAMER CARBONATE 1600 MILLIGRAM(S): 2400 POWDER, FOR SUSPENSION ORAL at 08:46

## 2020-06-23 RX ADMIN — HEPARIN SODIUM 5000 UNIT(S): 5000 INJECTION INTRAVENOUS; SUBCUTANEOUS at 13:03

## 2020-06-23 RX ADMIN — Medication 2: at 12:23

## 2020-06-23 RX ADMIN — AMLODIPINE BESYLATE 10 MILLIGRAM(S): 2.5 TABLET ORAL at 05:54

## 2020-06-23 RX ADMIN — Medication 650 MILLIGRAM(S): at 05:53

## 2020-06-23 RX ADMIN — HEPARIN SODIUM 5000 UNIT(S): 5000 INJECTION INTRAVENOUS; SUBCUTANEOUS at 05:54

## 2020-06-23 NOTE — PROGRESS NOTE ADULT - SUBJECTIVE AND OBJECTIVE BOX
[facility]   MD ANKIT CAMPOS DO ANAM SIDDIQUI ANGELA WONG, PA    TEL:  OFFICE: [phone]  DR HOWARD CELL: [phone]  DR. LEVY CELL: [phone]  DR. DÍAZ CELL: [phone]  BEN GILBERT CELL: [phone]    From 5pm-7am Answering Service [phone]    Patient is a 70y old  Female who presents with a chief complaint of SOB (22 Jun 2020 15:52)      Patient seen and examined at bedside. No chest pain/sob    VITALS:  T(F): 98.1 (06-23-20 @ 05:20), Max: 98.2 (06-22-20 @ 15:00)  HR: 76 (06-23-20 @ 05:20)  BP: 157/77 (06-23-20 @ 05:20)  RR: 18 (06-23-20 @ 05:20)  SpO2: 100% (06-23-20 @ 05:20)  Wt(kg): --    06-22 @ 07:01  -  06-23 @ 07:00  --------------------------------------------------------  IN: 150 mL / OUT: 0 mL / NET: 150 mL          PHYSICAL EXAM:  Constitutional: NAD  Neck: No JVD  Respiratory: slight basilar crackles   Cardiovascular: S1, S2, RRR  Gastrointestinal: BS+, soft, NT/ND, PD catheter in place, + dry blood, NT  Extremities: No peripheral edema    Hospital Medications:   MEDICATIONS  (STANDING):  amLODIPine   Tablet 10 milliGRAM(s) Oral daily  atorvastatin 40 milliGRAM(s) Oral at bedtime  bisoprolol   Tablet 5 milliGRAM(s) Oral at bedtime  chlorhexidine 4% Liquid 1 Application(s) Topical daily  dextrose 5%. 1000 milliLiter(s) (50 mL/Hr) IV Continuous <Continuous>  dextrose 50% Injectable 12.5 Gram(s) IV Push once  dextrose 50% Injectable 25 Gram(s) IV Push once  dextrose 50% Injectable 25 Gram(s) IV Push once  furosemide    Tablet 80 milliGRAM(s) Oral daily  heparin   Injectable 5000 Unit(s) SubCutaneous every 8 hours  insulin lispro (HumaLOG) corrective regimen sliding scale   SubCutaneous three times a day before meals  insulin lispro (HumaLOG) corrective regimen sliding scale   SubCutaneous at bedtime  levothyroxine 50 MICROGram(s) Oral daily  pantoprazole    Tablet 40 milliGRAM(s) Oral before breakfast  sevelamer carbonate 1600 milliGRAM(s) Oral three times a day with meals  sodium bicarbonate 650 milliGRAM(s) Oral two times a day      LABS:  06-23    134<L>  |  94<L>  |  65<H>  ----------------------------<  161<H>  3.9   |  22  |  7.42<H>    Ca    8.2<L>      23 Jun 2020 05:40  Phos  5.6     06-23  Mg     1.8     06-23    TPro  7.6  /  Alb  3.9  /  TBili  0.3  /  DBili      /  AST  9   /  ALT  < 5  /  AlkPhos  90  06-23    Creatinine Trend: 7.42 <--, 7.36 <--, 6.65 <--, 6.68 <--    Albumin, Serum: 3.9 g/dL (06-23 @ 05:40)  Phosphorus Level, Serum: 5.6 mg/dL (06-23 @ 05:40)                              7.9    10.67 )-----------( 187      ( 23 Jun 2020 05:40 )             23.9     Urine Studies:  Urinalysis - [06-21-20 @ 10:15]      Color LIGHT YELLOW / Appearance CLEAR / SG 1.012 / pH 6.5      Gluc 50 / Ketone NEGATIVE  / Bili NEGATIVE / Urobili NORMAL       Blood SMALL / Protein 600 / Leuk Est NEGATIVE / Nitrite NEGATIVE      RBC 3-5 / WBC 0-2 / Hyaline NEGATIVE / Gran  / Sq Epi OCC / Non Sq Epi  / Bacteria NEGATIVE        HBsAg NEGATIVE      [06-20-20 @ 20:33]  HCV 0.08, Nonreactive Hepatitis C AB  S/CO Ratio                        Interpretation  < 1.00                                   Non-Reactive  1.00 - 4.99                         Weakly-Reactive  >= 5.00                                Reactive  Non-Reactive: Aperson with a non-reactive HCV antibody  result is considered uninfected.  No further action is  needed unless recent infection is suspected.  In these  cases, consider repeat testing later to detect  seroconversion..  Weakly-Reactive: HCV antibody test is abnormal, HCV RNA  Qualitative test will follow.  Reactive: HCV antibody test is abnormal, HCV RNA  Qualitative test will follow.  Note: HCV antibody testing is performed on the [employer]   system.      [06-21-20 @ 05:57]      RADIOLOGY & ADDITIONAL STUDIES:

## 2020-06-23 NOTE — PROVIDER CONTACT NOTE (OTHER) - ACTION/TREATMENT ORDERED:
PA made aware. Stated she would pass it along to the day team. No new orders received at this time. Will continue to monitor.
Gema Haddad made aware, ordered rectal temperature, continue to monitor.
Gema Haddad made aware, ordered to increase O2 NC to 4L, continue to monitor.
Gema Haddad, made aware, increase O2 to 3L NC, administer 80mg of IV push Lasix, continue to monitor.

## 2020-06-23 NOTE — PROVIDER CONTACT NOTE (OTHER) - ASSESSMENT
Pt A&Ox4. Vitals stable. No complaints of chest pain, shortness of breath
No signs or symptoms of infection, no acute distress noted.
No signs or symptoms of shortness of breath, no acute distress noted.
Patient sitting in tripod position.

## 2020-06-23 NOTE — DISCHARGE NOTE NURSING/CASE MANAGEMENT/SOCIAL WORK - PATIENT PORTAL LINK FT
You can access the FollowMyHealth Patient Portal offered by [facility] by registering at the following website: [email or web address]. By joining [facility]’s FollowMyHealth portal, you will also be able to view your health information using other applications (apps) compatible with our system.

## 2020-06-23 NOTE — PROGRESS NOTE ADULT - ASSESSMENT
70F with PMH HTN, T2DM, ESRD with recently placed PD cath, not started on HD yet, presented with nausea and vomiting.    ESRD: S/P PD catheter placement 4 days ago. Not initiated on PD as yet.   presented with vomiting now improved, not likely uremic  no indication to initiate HD right now.   Pt started on lasix 80mg iv bid for dyspnea over the weekend. Symptoms Improved. continue lasix 80 po daily  Pt to follow up outpatient for PD initiation   Will get dialysis RN to change PD dressing today    stable from renal stand point for discharge.     Vomiting: Unlikely due to uremia. She feels better now. Possible due to gastroparesis. She may have gotten a narcotic for pain but that is unclear.  - symptoms resolved  - Odansetron PRN.  - Follow up BMP.    Leukocytosis:  No fever or any other findings of SIRS.  - Monitor.  - Catheter flush.    Hyperphosphatemia  continue renagel  monitor    Acidosis  continue bicarb  monitor    Anemia  8.9->7.9 today  will add on iron panel today  r/o acute bleed  consider repeat cbc 70F with PMH HTN, T2DM, ESRD with recently placed PD cath, not started on HD yet, presented with nausea and vomiting.    ESRD: S/P PD catheter placement 4 days ago. Not initiated on PD as yet.   presented with vomiting now improved, not likely uremic  no indication to initiate HD right now.   Pt started on lasix 80mg iv bid for dyspnea over the weekend. Symptoms Improved. continue lasix 80 po daily  Pt to follow up outpatient for PD initiation   Will get dialysis RN to change PD dressing today    stable from renal stand point for discharge.     Vomiting: Unlikely due to uremia. She feels better now. Possible due to gastroparesis. She may have gotten a narcotic for pain but that is unclear.  - symptoms resolved  - Odansetron PRN.  - Follow up BMP.    Leukocytosis:  No fever or any other findings of SIRS.  - Monitor.  - Catheter flush.    Hyperphosphatemia  continue renagel  monitor    Acidosis  continue bicarb  monitor    Anemia  8.9->7.9 today  will add on iron panel today  r/o acute bleed  consider repeat cbc    HTN  acceptable  continue lasix and norvasc  monitor

## 2020-06-26 LAB
CULTURE RESULTS: SIGNIFICANT CHANGE UP
CULTURE RESULTS: SIGNIFICANT CHANGE UP
SPECIMEN SOURCE: SIGNIFICANT CHANGE UP
SPECIMEN SOURCE: SIGNIFICANT CHANGE UP

## 2020-07-13 ENCOUNTER — APPOINTMENT (OUTPATIENT)
Dept: SURGERY | Facility: CLINIC | Age: 70
End: 2020-07-13
Payer: MEDICAID

## 2020-07-13 VITALS
BODY MASS INDEX: 25.1 KG/M2 | HEART RATE: 75 BPM | SYSTOLIC BLOOD PRESSURE: 116 MMHG | TEMPERATURE: 98.3 F | WEIGHT: 147 LBS | DIASTOLIC BLOOD PRESSURE: 73 MMHG | HEIGHT: 64 IN

## 2020-07-13 DIAGNOSIS — Z86.39 PERSONAL HISTORY OF OTHER ENDOCRINE, NUTRITIONAL AND METABOLIC DISEASE: ICD-10-CM

## 2020-07-13 DIAGNOSIS — Z86.2 PERSONAL HISTORY OF DISEASES OF THE BLOOD AND BLOOD-FORMING ORGANS AND CERTAIN DISORDERS INVOLVING THE IMMUNE MECHANISM: ICD-10-CM

## 2020-07-13 DIAGNOSIS — Z86.79 PERSONAL HISTORY OF OTHER DISEASES OF THE CIRCULATORY SYSTEM: ICD-10-CM

## 2020-07-13 PROCEDURE — 99024 POSTOP FOLLOW-UP VISIT: CPT

## 2020-07-15 ENCOUNTER — APPOINTMENT (OUTPATIENT)
Dept: TRANSPLANT | Facility: CLINIC | Age: 70
End: 2020-07-15
Payer: COMMERCIAL

## 2020-07-15 ENCOUNTER — APPOINTMENT (OUTPATIENT)
Dept: NEPHROLOGY | Facility: CLINIC | Age: 70
End: 2020-07-15
Payer: COMMERCIAL

## 2020-07-15 DIAGNOSIS — Z01.818 ENCOUNTER FOR OTHER PREPROCEDURAL EXAMINATION: ICD-10-CM

## 2020-07-15 PROCEDURE — 99205 OFFICE O/P NEW HI 60 MIN: CPT | Mod: 95

## 2020-07-15 PROCEDURE — 99205 OFFICE O/P NEW HI 60 MIN: CPT

## 2020-07-15 RX ORDER — SEVELAMER CARBONATE 800 MG/1
TABLET, FILM COATED ORAL
Refills: 0 | Status: DISCONTINUED | COMMUNITY
End: 2020-07-15

## 2020-07-15 RX ORDER — FUROSEMIDE 80 MG/1
80 TABLET ORAL DAILY
Refills: 0 | Status: ACTIVE | COMMUNITY
Start: 2020-07-15

## 2020-07-15 RX ORDER — LEVOTHYROXINE SODIUM 0.05 MG/1
50 TABLET ORAL DAILY
Refills: 0 | Status: ACTIVE | COMMUNITY
Start: 2020-07-15

## 2020-07-15 RX ORDER — ATORVASTATIN CALCIUM 10 MG/1
10 TABLET, FILM COATED ORAL
Refills: 0 | Status: DISCONTINUED | COMMUNITY
End: 2020-07-15

## 2020-07-15 RX ORDER — LEVOCETIRIZINE DIHYDROCHLORIDE 5 MG/1
TABLET ORAL
Refills: 0 | Status: DISCONTINUED | COMMUNITY
End: 2020-07-15

## 2020-07-15 RX ORDER — ATORVASTATIN CALCIUM 40 MG/1
40 TABLET, FILM COATED ORAL
Refills: 0 | Status: ACTIVE | COMMUNITY
Start: 2020-07-15

## 2020-07-15 RX ORDER — ALOGLIPTIN 25 MG/1
25 TABLET, FILM COATED ORAL DAILY
Refills: 0 | Status: ACTIVE | COMMUNITY
Start: 2020-07-15

## 2020-07-15 RX ORDER — CHLORHEXIDINE GLUCONATE 4 %
325 (65 FE) LIQUID (ML) TOPICAL
Qty: 14 | Refills: 0 | Status: ACTIVE | COMMUNITY
Start: 2020-07-15

## 2020-07-15 RX ORDER — SODIUM BICARBONATE 650 MG/1
650 TABLET ORAL
Qty: 120 | Refills: 6 | Status: ACTIVE | COMMUNITY
Start: 2020-07-15

## 2020-07-15 RX ORDER — SEVELAMER CARBONATE 800 MG/1
800 TABLET, FILM COATED ORAL 3 TIMES DAILY
Qty: 90 | Refills: 5 | Status: ACTIVE | COMMUNITY
Start: 2020-07-15

## 2020-07-15 RX ORDER — AMLODIPINE BESYLATE 10 MG/1
10 TABLET ORAL
Qty: 30 | Refills: 3 | Status: ACTIVE | COMMUNITY

## 2020-07-15 RX ORDER — OXYCODONE 5 MG/1
5 TABLET ORAL
Qty: 10 | Refills: 0 | Status: DISCONTINUED | COMMUNITY
Start: 2020-06-17 | End: 2020-07-15

## 2020-07-15 RX ORDER — ERGOCALCIFEROL 1.25 MG/1
1.25 MG CAPSULE, LIQUID FILLED ORAL
Qty: 15 | Refills: 3 | Status: ACTIVE | COMMUNITY
Start: 2020-07-15

## 2020-07-15 RX ORDER — BISOPROLOL FUMARATE 5 MG/1
5 TABLET, FILM COATED ORAL DAILY
Refills: 0 | Status: ACTIVE | COMMUNITY
Start: 2020-07-15

## 2020-07-15 RX ORDER — ALOGLIPTIN AND METFORMIN HYDROCHLORIDE 12.5; 1 MG/1; MG/1
TABLET, FILM COATED ORAL
Refills: 0 | Status: DISCONTINUED | COMMUNITY
End: 2020-07-15

## 2020-07-15 RX ORDER — SITAGLIPTIN 100 MG/1
TABLET, FILM COATED ORAL
Refills: 0 | Status: DISCONTINUED | COMMUNITY
End: 2020-07-15

## 2020-07-15 NOTE — PLAN
[TextBox_6] : CELIO has agreed to proceed with the evaluation.\par \par We discussed the risks and benefits of kidney transplantation in depth.  Surgical risks included but were not limited to bleeding, infection, graft thrombosis, ureteral and vascular strictures, delayed graft function, urine leak, and potential need for re operation postoperatively.  We also discussed the risks of postoperative immunosuppression including but not limited to increased risk of infection, cancer, weight gain, new onset or worsening of diabetes or hypertension on a temporary or permanent basis, water retention, back pain, constipation, diarrhea, dizziness, headache, joint pain, loss of appetite, nausea, stomach pain or upset, trouble sleeping, vomiting, and/or mental or mood changes were fully disclosed.  She understands these risks, and further understands that transplantation requires a life-long commitment, and is willing to proceed with kidney transplantation. She also understands that there is a risk of recurrent primary kidney disease in the transplanted organ.\par \par We discussed the differences in quality of life and patient survival between remaining on dialysis versus receiving a kidney transplant.  We also discussed increased benefits of receiving a live donor kidney transplant versus a  donor kidney transplant.\par \par We also discussed the risks and benefits of receiving a kidney from a donor with a Kidney Donor Profile Index (KDPI) score greater than 85% including a greater risk of delayed graft function, increased need for dialysis within the first 2-3 weeks after transplant, and statistically lower graft survival at 3 years.  \par \par Patient consent is in the chart.  Patient is aware that they may withdraw their consent for transplantation at any time.\par \par We discussed the one-year observed and expected patient and graft survival rates in comparison to the national one-year averages as described in the evaluation consent forms.\par \par Plan:\par \par Prior to consideration for transplant, the patient needs to have the following studies completed:\par \par - Labs and studies, as per transplant protocol, ordered at the time of evaluation\par - Cardiac clearance \par - Colonoscopy\par \par After the patient has completed the full kidney transplant evaluation and work up, she will be discussed in our Multi-disciplinary Kidney Transplant Recipient Selection Meeting. At that time we will determine his suitability for kidney transplantation. \par \par

## 2020-07-15 NOTE — HISTORY OF PRESENT ILLNESS
[FreeTextEntry4] : pt's son, Ray [Previous Kidney Transplant] : no previous kidney transplant [Claudication/Angina] : no claudication/angina [Cardiac History] : no cardiac history [TextBox_42] : 69yo F presents for renal txp evaluation.  Ms Tana discovered she had ESRD 5yrs ago when she was hospitalized in [facility] with a UTI. She started PD 2wks ago.  Her nephrologist is Dr. Calderon. \par \par She has been diabetic x12yrs and was on insulin for 3yrs but stopped after starting HD.  She has HTN.  She did not contract COVID/has had multiple tests.  She has never seen a cardiologist. \par \par She can ambulate without assistance, but does get easily fatigued.  She cannot tolerate stairs and leaves a sedentary lifestyle.  She denies claudication/angina.\par \par PMHx: hypothyroidism, hypercholesterolemia \par PSHx: PD catheter\par \par Meds reviewed\par NKDA\par \par FamHx: extensive DM\par SocHx: lives with son in [place].  Moved from [facility]. No tob/etoh/illicits. [TextBox_16] : 7/2020 [TextBox_20] : 2019 [TextBox_28] : 2008

## 2020-07-15 NOTE — HISTORY OF PRESENT ILLNESS
[Home] : at home, [unfilled] , at the time of the visit. [Medical Office: ([facility])___] : at the medical office located in  [Verbal consent obtained from patient] : the patient, [unfilled] [FreeTextEntry1] : 70 years old female, born in [facility], living in  for 1 year.\par Patient has known CKD (), ESRD (2020) on PD on follow up with Dr. Banks is here for pre kidney transplant evaluation. \par She is accompanied by her son Alondra today.\par She has known DM (age 58) On insulin (age 66), currently not on insulin, on Januvia; HTN (age 43). H/o Hyperlipidemia. No h/o Gout\par Has h/o hypothyroidism, (age 63) on levothyroxine.\par Has h/o anemia. No h/o transfusions\par No known h/o kidney stone \par No hematuria/Transfusions\par Urine out put:4-5 times a day\par Has no h/o Pneumonia / Has had  UTI. None for a year.\par No known h/o active CAD/CVA/PVD/DVT/neoplasia/active infections/bleeding.\par Reports no major allergies. Does not take any blood thinners. No known h/o tuberculosis or hepatitis.\par Most recent hospitalization/for:  for PD catheter placement at [facility]\par Past surgeries:\par PD catheter placement\par No history of kidney/ bladder  surgery.\par Non smoker.\par She is a  since . Lives with son and daughter in law.\par Fam: Parents . Father -  Mother- 94 years old healthy Siblings- 2 brothers and 8 sisters. Some have DM, some HTN.\par Children: 2 sons and a daughter , Healthy. 1 son and 1 daughter have DM\par No family history of kidney disease\par Independent for ADL\par Able to walk 4-5 blocks, can climb stairs with difficulty.\par ROS: Has no h/o shortness of breath on exertion. No h/o Sleep apnea.  \par Functional/employment status:\par Dialysis history:[facility] at [place]\par Kidney Biopsy:None\par Potential Live donors: None at present. Being explored.\par \par Prior Studies:\par Cardiology:None\par Cancer Screen:Colonoscopy- not yet; Mammogram, Pap smear- none yet\par Imaging:\par Consultants:Jocelyn (nephrology); Jayce Norris (surgeon)\par Primary MD:Dr. Storm Syed\par \par Prior Transplants:None\par \par \par Currently:\par \par Patient feels well\par Reviewed for acute symptoms-currently has none.\par I reviewed current home  blood pressure and home glucose control and medications.\par \par On Telehealth visit:\par Patient appears comfortable\par No distress, not dyspneic\par Conscious, alert, oriented X 3\par Speech: Normal\par Other observations as noted\par Reviewed any available lab data \par

## 2020-07-15 NOTE — ASSESSMENT
[FreeTextEntry1] : .Ms. DON 70 year She is evaluated for kidney transplantation.\par Pre transplant/ESRD: Patient will benefit from renal allotransplantation she is an acceptable risk candidate, diabetes, Needs further evaluation prior to listing.\par Medical risks: Cardiovascular, cancer screening.\par Diabetes Mellitus: Discussed implications. Continue follow up with primary physicians\par Hypertension: Discussed implications. Continue follow up with primary physicians.\par Cardiac risk:  will get further evaluation; echo, stress test; Reviewed cardiovascular risk reduction strategies\par Cancer screening: PAP/Mammo.  Colon beulah screening. No known h/o neoplastic disease\par ID: Serology for acute and chronic viral infections. Screening for latent TB. \par Imaging: Renal/abdominal /chest /Iliac  imaging\par Consults: Nutrition, social work, cardiology, Transplant surgery.\par Reviewed factors affecting survival and morbidity while on wait list and reviewed joni-operative and long-term risk factors affecting outcome in kidney transplantation.\par Details of transplant surgery, immunosuppression and its complications and benefits of live donor transplantation as well as variability in wait times across regions and multiple listing were discussed. KDPI >85% and PHS high risk criteria donors were discussed. Discussed factors affecting morbidity and mortality while on hemodialysis.\par Patient has potential live donor ( being explored) at present. \par Will proceed with completing/ updating work up and listing for transplant/ live donor transplant once work up is reviewed and found to be ok.\par

## 2020-07-25 ENCOUNTER — RESULT REVIEW (OUTPATIENT)
Age: 70
End: 2020-07-25

## 2020-07-25 ENCOUNTER — OUTPATIENT (OUTPATIENT)
Dept: OUTPATIENT SERVICES | Facility: HOSPITAL | Age: 70
LOS: 1 days | End: 2020-07-25
Payer: MEDICAID

## 2020-07-25 ENCOUNTER — APPOINTMENT (OUTPATIENT)
Dept: CT IMAGING | Facility: CLINIC | Age: 70
End: 2020-07-25
Payer: MEDICAID

## 2020-07-25 DIAGNOSIS — Z00.8 ENCOUNTER FOR OTHER GENERAL EXAMINATION: ICD-10-CM

## 2020-07-25 DIAGNOSIS — Z98.890 OTHER SPECIFIED POSTPROCEDURAL STATES: Chronic | ICD-10-CM

## 2020-07-25 PROCEDURE — 74177 CT ABD & PELVIS W/CONTRAST: CPT | Mod: 26

## 2020-07-25 PROCEDURE — 74177 CT ABD & PELVIS W/CONTRAST: CPT

## 2020-08-27 ENCOUNTER — APPOINTMENT (OUTPATIENT)
Dept: NEPHROLOGY | Facility: CLINIC | Age: 70
End: 2020-08-27
Payer: MEDICAID

## 2020-08-27 DIAGNOSIS — E11.9 TYPE 2 DIABETES MELLITUS W/OUT COMPLICATIONS: ICD-10-CM

## 2020-08-27 PROCEDURE — 99215 OFFICE O/P EST HI 40 MIN: CPT | Mod: 95

## 2020-08-27 RX ORDER — PANTOPRAZOLE 40 MG/1
40 TABLET, DELAYED RELEASE ORAL DAILY
Refills: 0 | Status: ACTIVE | COMMUNITY

## 2020-08-27 RX ORDER — OMEPRAZOLE 40 MG/1
40 CAPSULE, DELAYED RELEASE ORAL
Qty: 90 | Refills: 0 | Status: DISCONTINUED | COMMUNITY
Start: 2020-07-15 | End: 2020-08-27

## 2020-08-27 RX ORDER — CALCITRIOL 0.25 UG/1
0.25 CAPSULE, LIQUID FILLED ORAL DAILY
Refills: 0 | Status: ACTIVE | COMMUNITY

## 2020-08-27 NOTE — ASSESSMENT
[FreeTextEntry1] : .Ms. DON 70 year She is evaluated for kidney transplantation.\par Pre transplant/ESRD: Patient needs to have nutritional and physical rehabilitation for being optimized for transplant. \par Limited physical activity and food intake is poor. Hs had weight loss.\par However son is considering being a live donor and exploring other relatives as well. She is seeing Dr. Williamson for cardiology evaluation this week.\par Medical risks: Cardiovascular, cancer screening.\par Diabetes Mellitus: Discussed implications. Continue follow up with primary physicians\par Hypertension: Discussed implications. Continue follow up with primary physicians.\par Cardiac risk:  will get further evaluation; echo, stress test; Reviewed cardiovascular risk reduction strategies\par Cancer screening: PAP/Mammo.  Colon beulah screening. No known h/o neoplastic disease\par ID: Serology for acute and chronic viral infections. Screening for latent TB. \par Imaging: Renal/abdominal /chest /Iliac  imaging\par Consults: Nutrition, social work, cardiology, Transplant surgery.\par Reviewed factors affecting survival and morbidity while on wait list and reviewed joni-operative and long-term risk factors affecting outcome in kidney transplantation.\par Details of transplant surgery, immunosuppression and its complications and benefits of live donor transplantation as well as variability in wait times across regions and multiple listing were discussed. KDPI >85% and PHS high risk criteria donors were discussed. Discussed factors affecting morbidity and mortality while on hemodialysis.\par Patient has potential live donor ( son, others being explored) at present. \par \par Will need follow up in 3 months at which point will review any available work up and any live donor availability  to assess transplantability, meanwhile she will continue with nutritional and physical recovery.

## 2020-08-27 NOTE — HISTORY OF PRESENT ILLNESS
[FreeTextEntry1] : 70 years old female, born in [facility], living in  for 1 year.\par Patient has known CKD (), ESRD (2020) on PD on follow up with Dr. Banks is here for pre kidney transplant evaluation. \par She is accompanied by her son Alondra today.\par She has known DM (age 58) On insulin (age 66), currently not on insulin, on Januvia; HTN (age 43). H/o Hyperlipidemia. No h/o Gout\par Has h/o hypothyroidism, (age 63) on levothyroxine.\par Has h/o anemia. No h/o transfusions\par No known h/o kidney stone \par No hematuria/Transfusions\par Urine out put:4-5 times a day\par Has no h/o Pneumonia / Has had  UTI. None for a year.\par No known h/o active CAD/CVA/PVD/DVT/neoplasia/active infections/bleeding.\par Reports no major allergies. Does not take any blood thinners. No known h/o tuberculosis or hepatitis.\par Most recent hospitalization/for:  for PD catheter placement at [facility]\par Past surgeries:\par PD catheter placement\par No history of kidney/ bladder  surgery.\par Non smoker.\par She is a  since . Lives with son and daughter in law.\par Fam: Parents . Father -  Mother- 94 years old healthy Siblings- 2 brothers and 8 sisters. Some have DM, some HTN.\par Children: 2 sons and a daughter , Healthy. 1 son and 1 daughter have DM\par No family history of kidney disease\par Independent for ADL\par Able to walk 4-5 blocks, can climb stairs with difficulty.\par ROS: Has no h/o shortness of breath on exertion. No h/o Sleep apnea.  \par Functional/employment status:\par Dialysis history:[facility] at [place]\par Kidney Biopsy:None\par Potential Live donors: Son is still considering. Other relatives being explored.\par \par Prior Studies:\par Cardiology:None\par Cancer Screen:Colonoscopy- not yet; Mammogram, Pap smear- none yet\par Imaging:\par Consultants:Jocelyn (nephrology); Jayce Norris (surgeon)\par Primary MD:Dr. Storm Syed\par \par Prior Transplants:None\par \par \par Currently:\par Patient is recovering from C diff. illness, was on Vancomycin, off now. She was last hospitalized in . Doing [facility]. Still urinates. Appetite poor, has nausea and lost 15 Lbs. Walks inside the house, afraid to go out due to COVID, lives in apartment with her son.\par \par Patient feels easily fatigued.\par Reviewed for acute symptoms-as noted above\par I reviewed current home  blood pressure and home glucose control and medications.\par Weight: 135.2 Lbs Blood pressure 119/79 mm Hg \par Has appointment with Dr. Williamson for cardiology evaluation on 20.\par \par \par On Telehealth visit:\par Patient appears comfortable\par No distress, not dyspneic\par Conscious, alert, oriented X 3\par Speech: Normal\par Other observations as noted\par Reviewed any available lab data \par  [Home] : at home, [unfilled] , at the time of the visit. [Medical Office: ([facility])___] : at the medical office located in  [Verbal consent obtained from patient] : the patient, [unfilled]

## 2020-08-28 ENCOUNTER — APPOINTMENT (OUTPATIENT)
Dept: CARDIOLOGY | Facility: CLINIC | Age: 70
End: 2020-08-28
Payer: COMMERCIAL

## 2020-08-28 ENCOUNTER — NON-APPOINTMENT (OUTPATIENT)
Age: 70
End: 2020-08-28

## 2020-08-28 VITALS
DIASTOLIC BLOOD PRESSURE: 71 MMHG | HEIGHT: 64 IN | WEIGHT: 138 LBS | BODY MASS INDEX: 23.56 KG/M2 | SYSTOLIC BLOOD PRESSURE: 127 MMHG | OXYGEN SATURATION: 99 % | TEMPERATURE: 97.2 F | HEART RATE: 65 BPM

## 2020-08-28 DIAGNOSIS — I10 ESSENTIAL (PRIMARY) HYPERTENSION: ICD-10-CM

## 2020-08-28 PROCEDURE — 93000 ELECTROCARDIOGRAM COMPLETE: CPT

## 2020-08-28 PROCEDURE — 99203 OFFICE O/P NEW LOW 30 MIN: CPT

## 2020-08-28 NOTE — DISCUSSION/SUMMARY
[Stable] : stable [Hypertension] : hypertension [FreeTextEntry1] : \par Currently stable from a cardiovascular standpoint. Normotensive. Euvolemic. Asymptomatic. Continure current medications. ECG completed today and reviewed. Will schedule an exercise nuclear stress test to rule out any significant CAD. In addition, will schedule an echocardiogram to assess her cardiac structures and function. Pending the test results, I will make further recommendations.

## 2020-08-28 NOTE — HISTORY OF PRESENT ILLNESS
[FreeTextEntry1] : Patient with DM, HTN, and ESRD. Conditions have been stable. Currently doing okay. Denies chest pain, shortness of breath or palpitations.

## 2020-08-28 NOTE — PHYSICAL EXAM
[General Appearance - Well Developed] : well developed [Normal Appearance] : normal appearance [Well Groomed] : well groomed [General Appearance - Well Nourished] : well nourished [No Deformities] : no deformities [General Appearance - In No Acute Distress] : no acute distress [Normal Oral Mucosa] : normal oral mucosa [Normal Conjunctiva] : the conjunctiva exhibited no abnormalities [Eyelids - No Xanthelasma] : the eyelids demonstrated no xanthelasmas [No Oral Pallor] : no oral pallor [No Oral Cyanosis] : no oral cyanosis [FreeTextEntry1] : no carotid bruits or JVD [Heart Rate And Rhythm] : heart rate and rhythm were normal [Heart Sounds] : normal S1 and S2 [Murmurs] : no murmurs present [Edema] : no peripheral edema present [Respiration, Rhythm And Depth] : normal respiratory rhythm and effort [Exaggerated Use Of Accessory Muscles For Inspiration] : no accessory muscle use [Abdomen Soft] : soft [Auscultation Breath Sounds / Voice Sounds] : lungs were clear to auscultation bilaterally [Abnormal Walk] : normal gait [Abdomen Tenderness] : non-tender [Cyanosis, Localized] : no localized cyanosis [Skin Color & Pigmentation] : normal skin color and pigmentation [Oriented To Time, Place, And Person] : oriented to person, place, and time [] : no rash [No Anxiety] : not feeling anxious

## 2020-11-12 ENCOUNTER — APPOINTMENT (OUTPATIENT)
Dept: GASTROENTEROLOGY | Facility: CLINIC | Age: 70
End: 2020-11-12
Payer: MEDICAID

## 2020-11-12 DIAGNOSIS — Z12.11 ENCOUNTER FOR SCREENING FOR MALIGNANT NEOPLASM OF COLON: ICD-10-CM

## 2020-11-12 DIAGNOSIS — R11.2 NAUSEA WITH VOMITING, UNSPECIFIED: ICD-10-CM

## 2020-11-12 PROCEDURE — 99203 OFFICE O/P NEW LOW 30 MIN: CPT

## 2020-11-12 PROCEDURE — 99072 ADDL SUPL MATRL&STAF TM PHE: CPT

## 2020-11-12 RX ORDER — ONDANSETRON 4 MG/1
4 TABLET, ORALLY DISINTEGRATING ORAL DAILY
Qty: 30 | Refills: 0 | Status: ACTIVE | COMMUNITY
Start: 2020-07-15

## 2020-11-12 NOTE — ASSESSMENT
[FreeTextEntry1] : 1. chronic n/v with weight loss, ct scan unrevealing DDX:GOO, pud, Gastric dysmoitlitiy,ppi does not help\par \par plan zofran prn\par             gastric empyting scan\par          egd to be scheduled after stress echo and medical clearance\par \par 2. average risk for colon cancer recommend colonoscopy for screening\par \par plan; colonoscopy to be scheduled after stress test and medical clearance

## 2020-11-12 NOTE — PHYSICAL EXAM
[General Appearance - Alert] : alert [General Appearance - In No Acute Distress] : in no acute distress [General Appearance - Well Nourished] : well nourished [General Appearance - Well Developed] : well developed [Sclera] : the sclera and conjunctiva were normal [Neck Cervical Mass (___cm)] : no neck mass was observed [Respiration, Rhythm And Depth] : normal respiratory rhythm and effort [Auscultation Breath Sounds / Voice Sounds] : lungs were clear to auscultation bilaterally [Heart Sounds] : normal S1 and S2 ambulatory [Bowel Sounds] : normal bowel sounds [Abdomen Soft] : soft [Abdomen Tenderness] : non-tender [Abnormal Walk] : normal gait [Nail Clubbing] : no clubbing  or cyanosis of the fingernails [] : no rash [Skin Lesions] : no skin lesions [No Focal Deficits] : no focal deficits [Oriented To Time, Place, And Person] : oriented to person, place, and time

## 2020-11-12 NOTE — REASON FOR VISIT
[Initial Evaluation] : an initial evaluation [Family Member] : family member [FreeTextEntry1] : nausea and vomiting

## 2020-11-12 NOTE — HISTORY OF PRESENT ILLNESS
[FreeTextEntry1] : 69 yo female with h/o DM,HTN, and ESTD on peritoneal dialysis, h/o c.difficile and being evaluated for renal transplant.  Patient  son translating, with vomiting almost daily for  6 months. Patient takes pantoprazole in AM, normal bms,  no brbpr, no melena.   15lb weight loss in past few months\par \par never had egd/colonoscopy

## 2020-11-12 NOTE — REVIEW OF SYSTEMS
[As Noted in HPI] : as noted in HPI [Fever] : no fever [Chills] : no chills [Eyesight Problems] : no eyesight problems [Nosebleeds] : no nosebleeds [Chest Pain] : no chest pain [Cough] : no cough [SOB on Exertion] : no shortness of breath during exertion [Dysuria] : no dysuria [Joint Swelling] : no joint swelling [Itching] : no itching [Dizziness] : no dizziness [Anxiety] : no anxiety [Depression] : no depression [Muscle Weakness] : no muscle weakness [Easy Bleeding] : no tendency for easy bleeding

## 2020-12-23 PROBLEM — Z12.11 ENCOUNTER FOR SCREENING COLONOSCOPY: Status: RESOLVED | Noted: 2020-11-12 | Resolved: 2020-12-23

## 2021-01-06 ENCOUNTER — APPOINTMENT (OUTPATIENT)
Dept: CV DIAGNOSTICS | Facility: HOSPITAL | Age: 71
End: 2021-01-06
Payer: COMMERCIAL

## 2021-01-06 ENCOUNTER — OUTPATIENT (OUTPATIENT)
Dept: OUTPATIENT SERVICES | Facility: HOSPITAL | Age: 71
LOS: 1 days | End: 2021-01-06

## 2021-01-06 ENCOUNTER — APPOINTMENT (OUTPATIENT)
Dept: CV DIAGNOSITCS | Facility: HOSPITAL | Age: 71
End: 2021-01-06
Payer: COMMERCIAL

## 2021-01-06 DIAGNOSIS — I10 ESSENTIAL (PRIMARY) HYPERTENSION: ICD-10-CM

## 2021-01-06 DIAGNOSIS — Z98.890 OTHER SPECIFIED POSTPROCEDURAL STATES: Chronic | ICD-10-CM

## 2021-01-06 PROCEDURE — 93306 TTE W/DOPPLER COMPLETE: CPT | Mod: 26

## 2021-01-06 PROCEDURE — 78452 HT MUSCLE IMAGE SPECT MULT: CPT | Mod: 26

## 2021-01-06 PROCEDURE — 93016 CV STRESS TEST SUPVJ ONLY: CPT | Mod: GC

## 2021-01-06 PROCEDURE — 93018 CV STRESS TEST I&R ONLY: CPT | Mod: GC

## 2021-01-19 ENCOUNTER — NON-APPOINTMENT (OUTPATIENT)
Age: 71
End: 2021-01-19

## 2021-02-22 ENCOUNTER — APPOINTMENT (OUTPATIENT)
Dept: HEPATOLOGY | Facility: HOSPITAL | Age: 71
End: 2021-02-22

## 2021-02-25 DIAGNOSIS — Z01.818 ENCOUNTER FOR OTHER PREPROCEDURAL EXAMINATION: ICD-10-CM

## 2021-02-27 ENCOUNTER — APPOINTMENT (OUTPATIENT)
Dept: DISASTER EMERGENCY | Facility: CLINIC | Age: 71
End: 2021-02-27

## 2021-02-28 LAB — SARS-COV-2 N GENE NPH QL NAA+PROBE: NOT DETECTED

## 2021-03-02 ENCOUNTER — APPOINTMENT (OUTPATIENT)
Dept: GASTROENTEROLOGY | Facility: CLINIC | Age: 71
End: 2021-03-02
Payer: MEDICAID

## 2021-03-02 ENCOUNTER — LABORATORY RESULT (OUTPATIENT)
Age: 71
End: 2021-03-02

## 2021-03-02 PROCEDURE — 99072 ADDL SUPL MATRL&STAF TM PHE: CPT

## 2021-03-02 PROCEDURE — 45380 COLONOSCOPY AND BIOPSY: CPT

## 2021-03-02 PROCEDURE — 43239 EGD BIOPSY SINGLE/MULTIPLE: CPT

## 2021-03-25 ENCOUNTER — LABORATORY RESULT (OUTPATIENT)
Age: 71
End: 2021-03-25

## 2021-03-25 ENCOUNTER — APPOINTMENT (OUTPATIENT)
Dept: TRANSPLANT | Facility: CLINIC | Age: 71
End: 2021-03-25
Payer: COMMERCIAL

## 2021-03-25 VITALS
BODY MASS INDEX: 23.56 KG/M2 | HEART RATE: 65 BPM | TEMPERATURE: 97.8 F | WEIGHT: 138 LBS | OXYGEN SATURATION: 98 % | HEIGHT: 64 IN | DIASTOLIC BLOOD PRESSURE: 71 MMHG | SYSTOLIC BLOOD PRESSURE: 125 MMHG

## 2021-03-25 DIAGNOSIS — Z01.818 ENCOUNTER FOR OTHER PREPROCEDURAL EXAMINATION: ICD-10-CM

## 2021-03-25 PROCEDURE — 99214 OFFICE O/P EST MOD 30 MIN: CPT

## 2021-03-25 PROCEDURE — 99072 ADDL SUPL MATRL&STAF TM PHE: CPT

## 2021-03-25 NOTE — ASSESSMENT
[Fair candidate] : a fair candidate. We should proceed with our protocol for evaluation for kidney transplantation. [FreeTextEntry1] : Acceptable candidate for listing, however discussed likelihood of living-donor only status.

## 2021-03-25 NOTE — PHYSICAL EXAM
[Well Developed] : well developed [Well Nourished] : well nourished [Breathing Comfortably on RA] : breathing comfortably on room air [Normal Rate] : normal rate [Soft] : soft [Non-tender] : non-tender [] : right posterior tibial palpable

## 2021-03-25 NOTE — HISTORY OF PRESENT ILLNESS
[TextBox_42] : 69yo F presents for renal txp evaluation.  Ms Tana discovered she had ESRD 5yrs ago when she was hospitalized in [facility] with a UTI. She started PD 2wks ago.  Her nephrologist is Dr. Calderon. \par \par She has been diabetic x12yrs and was on insulin for 3yrs but stopped after starting HD.  She has HTN.  She did not contract COVID/has had multiple tests.  She has never seen a cardiologist. \par \par She can ambulate without assistance, but does get easily fatigued.  She cannot tolerate stairs and leaves a sedentary lifestyle.  She denies claudication/angina.\par \par PMHx: hypothyroidism, hypercholesterolemia \par PSHx: PD catheter\par \par Meds reviewed\par NKDA\par \par FamHx: extensive DM\par SocHx: lives with son in [place].  Moved from [facility]. No tob/etoh/illicits. [de-identified] : Seen for f/u prior to committee presentation.  No interval events.  Lives with son.  On PD.

## 2021-03-26 ENCOUNTER — APPOINTMENT (OUTPATIENT)
Dept: TRANSPLANT | Facility: CLINIC | Age: 71
End: 2021-03-26
Payer: MEDICAID

## 2021-03-26 LAB
ABO + RH PNL BLD: NORMAL
ALBUMIN SERPL ELPH-MCNC: 3.2 G/DL
ALP BLD-CCNC: 94 U/L
ALT SERPL-CCNC: 13 U/L
ANION GAP SERPL CALC-SCNC: 16 MMOL/L
APPEARANCE: ABNORMAL
AST SERPL-CCNC: 16 U/L
BACTERIA: NEGATIVE
BASOPHILS # BLD AUTO: 0.11 K/UL
BASOPHILS NFR BLD AUTO: 1.4 %
BILIRUB SERPL-MCNC: 0.2 MG/DL
BILIRUBIN URINE: NEGATIVE
BLOOD URINE: NORMAL
BUN SERPL-MCNC: 22 MG/DL
CALCIUM SERPL-MCNC: 8.8 MG/DL
CHLORIDE SERPL-SCNC: 103 MMOL/L
CHOLEST SERPL-MCNC: 157 MG/DL
CMV IGG SERPL QL: >10 U/ML
CMV IGG SERPL-IMP: POSITIVE
CO2 SERPL-SCNC: 24 MMOL/L
COLOR: YELLOW
COVID-19 SPIKE DOMAIN ANTIBODY INTERPRETATION: POSITIVE
CREAT SERPL-MCNC: 4.96 MG/DL
CREAT SPEC-SCNC: 170 MG/DL
CREAT/PROT UR: 1.7 RATIO
EBV EA AB SER IA-ACNC: <5 U/ML
EBV EA AB TITR SER IF: POSITIVE
EBV EA IGG SER QL IA: 579 U/ML
EBV EA IGG SER-ACNC: NEGATIVE
EBV EA IGM SER IA-ACNC: NEGATIVE
EBV PATRN SPEC IB-IMP: NORMAL
EBV VCA IGG SER IA-ACNC: 37.5 U/ML
EBV VCA IGM SER QL IA: <10 U/ML
EOSINOPHIL # BLD AUTO: 0.18 K/UL
EOSINOPHIL NFR BLD AUTO: 2.3 %
EPSTEIN-BARR VIRUS CAPSID ANTIGEN IGG: POSITIVE
ESTIMATED AVERAGE GLUCOSE: 120 MG/DL
GLUCOSE QUALITATIVE U: ABNORMAL
GLUCOSE SERPL-MCNC: 163 MG/DL
HAV IGM SER QL: NONREACTIVE
HBA1C MFR BLD HPLC: 5.8 %
HBV CORE IGG+IGM SER QL: NONREACTIVE
HBV SURFACE AB SER QL: REACTIVE
HBV SURFACE AB SERPL IA-ACNC: 773.9 MIU/ML
HBV SURFACE AG SER QL: NONREACTIVE
HCG SERPL-MCNC: 5 MIU/ML
HCT VFR BLD CALC: 35.2 %
HCV AB SER QL: NONREACTIVE
HCV S/CO RATIO: 0.04 S/CO
HDLC SERPL-MCNC: 42 MG/DL
HEPATITIS A IGG ANTIBODY: REACTIVE
HGB BLD-MCNC: 10.9 G/DL
HIV1+2 AB SPEC QL IA.RAPID: NONREACTIVE
HSV 1+2 IGG SER IA-IMP: NEGATIVE
HSV 1+2 IGG SER IA-IMP: NEGATIVE
HSV1 IGG SER QL: 0.06 INDEX
HSV2 IGG SER QL: 0.1 INDEX
HYALINE CASTS: 1 /LPF
IMM GRANULOCYTES NFR BLD AUTO: 0.3 %
KETONES URINE: NEGATIVE
LDLC SERPL CALC-MCNC: 89 MG/DL
LEUKOCYTE ESTERASE URINE: NEGATIVE
LYMPHOCYTES # BLD AUTO: 1.56 K/UL
LYMPHOCYTES NFR BLD AUTO: 19.6 %
MAGNESIUM SERPL-MCNC: 1.7 MG/DL
MAN DIFF?: NORMAL
MCHC RBC-ENTMCNC: 28.9 PG
MCHC RBC-ENTMCNC: 31 GM/DL
MCV RBC AUTO: 93.4 FL
MICROSCOPIC-UA: NORMAL
MONOCYTES # BLD AUTO: 0.42 K/UL
MONOCYTES NFR BLD AUTO: 5.3 %
NEUTROPHILS # BLD AUTO: 5.65 K/UL
NEUTROPHILS NFR BLD AUTO: 71.1 %
NITRITE URINE: NEGATIVE
NONHDLC SERPL-MCNC: 115 MG/DL
PH URINE: 6.5
PHOSPHATE SERPL-MCNC: 4.4 MG/DL
PLATELET # BLD AUTO: 250 K/UL
POTASSIUM SERPL-SCNC: 4 MMOL/L
PROT SERPL-MCNC: 5.8 G/DL
PROT UR-MCNC: 292 MG/DL
PROTEIN URINE: ABNORMAL
RBC # BLD: 3.77 M/UL
RBC # FLD: 13 %
RED BLOOD CELLS URINE: 1 /HPF
ROGOSIN: NORMAL
RUBV IGG FLD-ACNC: 1.9 INDEX
RUBV IGG SER-IMP: POSITIVE
SARS-COV-2 AB SERPL IA-ACNC: >250 U/ML
SODIUM SERPL-SCNC: 144 MMOL/L
SPECIFIC GRAVITY URINE: 1.01
SQUAMOUS EPITHELIAL CELLS: >27 /HPF
T GONDII AB SER-IMP: POSITIVE
T GONDII IGG SER QL: 13.6 IU/ML
T PALLIDUM AB SER QL IA: NEGATIVE
TRIGL SERPL-MCNC: 131 MG/DL
UROBILINOGEN URINE: NORMAL
VZV AB TITR SER: NEGATIVE
VZV IGG SER IF-ACNC: 113.8 INDEX
WBC # FLD AUTO: 7.94 K/UL
WHITE BLOOD CELLS URINE: 7 /HPF

## 2021-03-26 PROCEDURE — 81382T: CUSTOM

## 2021-04-09 ENCOUNTER — NON-APPOINTMENT (OUTPATIENT)
Age: 71
End: 2021-04-09

## 2021-04-09 LAB
EBV DNA SERPL NAA+PROBE-ACNC: NOT DETECTED IU/ML
M TB IFN-G BLD-IMP: NEGATIVE
QUANTIFERON TB PLUS MITOGEN MINUS NIL: >10 IU/ML
QUANTIFERON TB PLUS NIL: 0.04 IU/ML
QUANTIFERON TB PLUS TB1 MINUS NIL: 0 IU/ML
QUANTIFERON TB PLUS TB2 MINUS NIL: 0 IU/ML

## 2021-06-24 ENCOUNTER — NON-APPOINTMENT (OUTPATIENT)
Age: 71
End: 2021-06-24

## 2021-12-15 ENCOUNTER — EMERGENCY (EMERGENCY)
Facility: HOSPITAL | Age: 71
LOS: 1 days | Discharge: HOME | End: 2021-12-15
Attending: EMERGENCY MEDICINE | Admitting: EMERGENCY MEDICINE
Payer: MEDICAID

## 2021-12-15 VITALS
TEMPERATURE: 98 F | OXYGEN SATURATION: 100 % | DIASTOLIC BLOOD PRESSURE: 67 MMHG | HEART RATE: 68 BPM | RESPIRATION RATE: 18 BRPM | SYSTOLIC BLOOD PRESSURE: 128 MMHG

## 2021-12-15 VITALS
RESPIRATION RATE: 22 BRPM | SYSTOLIC BLOOD PRESSURE: 151 MMHG | OXYGEN SATURATION: 100 % | HEIGHT: 61 IN | DIASTOLIC BLOOD PRESSURE: 72 MMHG | HEART RATE: 66 BPM | TEMPERATURE: 98 F

## 2021-12-15 DIAGNOSIS — Z98.890 OTHER SPECIFIED POSTPROCEDURAL STATES: Chronic | ICD-10-CM

## 2021-12-15 LAB
ALBUMIN SERPL ELPH-MCNC: 4 G/DL — SIGNIFICANT CHANGE UP (ref 3.3–5)
ALP SERPL-CCNC: 100 U/L — SIGNIFICANT CHANGE UP (ref 40–120)
ALT FLD-CCNC: 9 U/L — SIGNIFICANT CHANGE UP (ref 4–33)
ANION GAP SERPL CALC-SCNC: 15 MMOL/L — HIGH (ref 7–14)
AST SERPL-CCNC: 14 U/L — SIGNIFICANT CHANGE UP (ref 4–32)
B PERT DNA SPEC QL NAA+PROBE: SIGNIFICANT CHANGE UP
B PERT+PARAPERT DNA PNL SPEC NAA+PROBE: SIGNIFICANT CHANGE UP
BASOPHILS # BLD AUTO: 0.06 K/UL — SIGNIFICANT CHANGE UP (ref 0–0.2)
BASOPHILS NFR BLD AUTO: 0.9 % — SIGNIFICANT CHANGE UP (ref 0–2)
BILIRUB SERPL-MCNC: 0.4 MG/DL — SIGNIFICANT CHANGE UP (ref 0.2–1.2)
BORDETELLA PARAPERTUSSIS (RAPRVP): SIGNIFICANT CHANGE UP
BUN SERPL-MCNC: 30 MG/DL — HIGH (ref 7–23)
C PNEUM DNA SPEC QL NAA+PROBE: SIGNIFICANT CHANGE UP
CALCIUM SERPL-MCNC: 8.5 MG/DL — SIGNIFICANT CHANGE UP (ref 8.4–10.5)
CHLORIDE SERPL-SCNC: 100 MMOL/L — SIGNIFICANT CHANGE UP (ref 98–107)
CO2 SERPL-SCNC: 24 MMOL/L — SIGNIFICANT CHANGE UP (ref 22–31)
CREAT SERPL-MCNC: 7.13 MG/DL — HIGH (ref 0.5–1.3)
EOSINOPHIL # BLD AUTO: 0.13 K/UL — SIGNIFICANT CHANGE UP (ref 0–0.5)
EOSINOPHIL NFR BLD AUTO: 1.9 % — SIGNIFICANT CHANGE UP (ref 0–6)
FLUAV SUBTYP SPEC NAA+PROBE: SIGNIFICANT CHANGE UP
FLUBV RNA SPEC QL NAA+PROBE: SIGNIFICANT CHANGE UP
GLUCOSE SERPL-MCNC: 111 MG/DL — HIGH (ref 70–99)
HADV DNA SPEC QL NAA+PROBE: SIGNIFICANT CHANGE UP
HCOV 229E RNA SPEC QL NAA+PROBE: SIGNIFICANT CHANGE UP
HCOV HKU1 RNA SPEC QL NAA+PROBE: SIGNIFICANT CHANGE UP
HCOV NL63 RNA SPEC QL NAA+PROBE: SIGNIFICANT CHANGE UP
HCOV OC43 RNA SPEC QL NAA+PROBE: SIGNIFICANT CHANGE UP
HCT VFR BLD CALC: 29.3 % — LOW (ref 34.5–45)
HGB BLD-MCNC: 9.4 G/DL — LOW (ref 11.5–15.5)
HMPV RNA SPEC QL NAA+PROBE: SIGNIFICANT CHANGE UP
HPIV1 RNA SPEC QL NAA+PROBE: SIGNIFICANT CHANGE UP
HPIV2 RNA SPEC QL NAA+PROBE: SIGNIFICANT CHANGE UP
HPIV3 RNA SPEC QL NAA+PROBE: SIGNIFICANT CHANGE UP
HPIV4 RNA SPEC QL NAA+PROBE: SIGNIFICANT CHANGE UP
IANC: 4.87 K/UL — SIGNIFICANT CHANGE UP (ref 1.5–8.5)
IMM GRANULOCYTES NFR BLD AUTO: 0.3 % — SIGNIFICANT CHANGE UP (ref 0–1.5)
LYMPHOCYTES # BLD AUTO: 1.22 K/UL — SIGNIFICANT CHANGE UP (ref 1–3.3)
LYMPHOCYTES # BLD AUTO: 18 % — SIGNIFICANT CHANGE UP (ref 13–44)
M PNEUMO DNA SPEC QL NAA+PROBE: SIGNIFICANT CHANGE UP
MCHC RBC-ENTMCNC: 29.2 PG — SIGNIFICANT CHANGE UP (ref 27–34)
MCHC RBC-ENTMCNC: 32.1 GM/DL — SIGNIFICANT CHANGE UP (ref 32–36)
MCV RBC AUTO: 91 FL — SIGNIFICANT CHANGE UP (ref 80–100)
MONOCYTES # BLD AUTO: 0.47 K/UL — SIGNIFICANT CHANGE UP (ref 0–0.9)
MONOCYTES NFR BLD AUTO: 6.9 % — SIGNIFICANT CHANGE UP (ref 2–14)
NEUTROPHILS # BLD AUTO: 4.87 K/UL — SIGNIFICANT CHANGE UP (ref 1.8–7.4)
NEUTROPHILS NFR BLD AUTO: 72 % — SIGNIFICANT CHANGE UP (ref 43–77)
NRBC # BLD: 0 /100 WBCS — SIGNIFICANT CHANGE UP
NRBC # FLD: 0 K/UL — SIGNIFICANT CHANGE UP
NT-PROBNP SERPL-SCNC: HIGH PG/ML
PLATELET # BLD AUTO: 161 K/UL — SIGNIFICANT CHANGE UP (ref 150–400)
POTASSIUM SERPL-MCNC: 3.9 MMOL/L — SIGNIFICANT CHANGE UP (ref 3.5–5.3)
POTASSIUM SERPL-SCNC: 3.9 MMOL/L — SIGNIFICANT CHANGE UP (ref 3.5–5.3)
PROT SERPL-MCNC: 6.6 G/DL — SIGNIFICANT CHANGE UP (ref 6–8.3)
RAPID RVP RESULT: SIGNIFICANT CHANGE UP
RBC # BLD: 3.22 M/UL — LOW (ref 3.8–5.2)
RBC # FLD: 13.2 % — SIGNIFICANT CHANGE UP (ref 10.3–14.5)
RSV RNA SPEC QL NAA+PROBE: SIGNIFICANT CHANGE UP
RV+EV RNA SPEC QL NAA+PROBE: SIGNIFICANT CHANGE UP
SARS-COV-2 RNA SPEC QL NAA+PROBE: SIGNIFICANT CHANGE UP
SODIUM SERPL-SCNC: 139 MMOL/L — SIGNIFICANT CHANGE UP (ref 135–145)
TROPONIN T, HIGH SENSITIVITY RESULT: 53 NG/L — CRITICAL HIGH
TROPONIN T, HIGH SENSITIVITY RESULT: 57 NG/L — CRITICAL HIGH
WBC # BLD: 6.77 K/UL — SIGNIFICANT CHANGE UP (ref 3.8–10.5)
WBC # FLD AUTO: 6.77 K/UL — SIGNIFICANT CHANGE UP (ref 3.8–10.5)

## 2021-12-15 PROCEDURE — 71046 X-RAY EXAM CHEST 2 VIEWS: CPT | Mod: 26

## 2021-12-15 PROCEDURE — 99285 EMERGENCY DEPT VISIT HI MDM: CPT | Mod: 25

## 2021-12-15 PROCEDURE — 93010 ELECTROCARDIOGRAM REPORT: CPT

## 2021-12-15 NOTE — ED PROVIDER NOTE - CLINICAL SUMMARY MEDICAL DECISION MAKING FREE TEXT BOX
71F p/w CP and SOB for over a week.  Exam as above, VSS.  EKG does not show any acute changes.  PD catheter site appears clean, no erythema, no suspicion for overlying skin infection.  Will eval for ACS vs pulm edema vs rib fractures.  Will obtain labs, CXR, EKG, reassess, and dispo pending results.

## 2021-12-15 NOTE — ED PROVIDER NOTE - MDM ORDERS SUBMITTED SELECTION
Spoke to the patient regarding his ultrasound results as documented below. He will follow up with general surgery (order placed).    Labs/EKG/Imaging Studies

## 2021-12-15 NOTE — ED PROVIDER NOTE - NSFOLLOWUPINSTRUCTIONS_ED_ALL_ED_FT
You were seen for chest pain.    Your work-up in the ED did not reveal anything acutely concerning.    Please follow-up with your PCP and nephrologist within one week and bring a copy of your results.    If you have any concerning symptoms, seek immediate medical attention.

## 2021-12-15 NOTE — ED PROVIDER NOTE - PHYSICAL EXAMINATION
GENERAL: Patient awake alert NAD.  HEENT: NC/AT.  LUNGS: CTAB, no wheezes or crackles.  CARDIAC: RRR, no m/r/g.  ABDOMEN: Soft, NT, ND, No rebound, guarding. PD catheter site appears clean w/o any overlying erythema.  EXT: No edema. No calf tenderness.  MSK: Left and right chest wall TTP, worse on left.  NEURO: A&Ox3. Moving all extremities.  SKIN: Warm and dry. No rash.  PSYCH: Normal affect. GENERAL: Patient awake alert NAD.  HEENT: NC/AT.  LUNGS: no wheezes, mild scant crackles at base, otherwise clear b/l.  CARDIAC: RRR, no m/r/g.  ABDOMEN: Soft, NT, ND, No rebound, guarding. PD catheter site appears clean w/o any overlying erythema.  EXT: No edema. No calf tenderness.  MSK: Left and right chest wall TTP, worse on left.  NEURO: A&Ox3. Moving all extremities.  SKIN: Warm and dry. No rash.  PSYCH: Normal affect.

## 2021-12-15 NOTE — ED ADULT NURSE NOTE - OBJECTIVE STATEMENT
71 yr old female pt presents to ED for evaluation of chest pain, and sob. pt with pmh of HTN, and ESRD on peritoneal dialysis (incomplete today), has been experiencing left sided non radiating chest pain exertional, and sob that worsens at night x 2 weeks but has gotten worse over past 3 days. pt axox4 in nad, denies any fever chills n+v diarrhea. placed on bedside monitor, labwork collected, peripheral IV established 20g to L ac, awaiting results and disposition.

## 2021-12-15 NOTE — ED PROVIDER NOTE - ATTENDING CONTRIBUTION TO CARE
72yo F PMHx of ESRD on daily PD, HTN, DM, p/w 1-1.5wks intermittent sob, worse when supine, also having intermittent L sided nonradiating chest pains for same time period which are only triggered by truncal rotation, bending, and touching area over ribs.  No direct trauma to chest endorsed but pt admits to mechanical trip and fall ~1wk ago forward onto arms and also L side.  Says she was having some chest pain before fall.  No fevers. No prior cardiac stents or stress testing to her knowledge    General: Patient alert in no apparent distress  Skin: Dry and intact  HEENT: Head atraumatic. Oral mucosa moist.   Eyes: Conjunctiva normal  Cardiac: Regular rhythm and rate. 1+ pretibial edema b/l. +ttp to L raibs under left breast with no overlying skin changes  Respiratory: Lungs symmetric, scant bibasial crackles, otherwise clear. No respiratory distress. Able to speak in complete sentences.  Gastrointestinal: Abdomen soft, nondistended, nontender  Musculoskeletal: Moves all extremities spontaneously  Neurological: alert and oriented to person, place, and time  Psychiatric: Calm and cooperative    EKG nsr with no acute ischemic abnormalities     a/p  SOB-likely from fluid overload/esrd. on room air with no acute resp distress  chest pains-MSK related rather than ACS given hx and exam  will get labs with trop given age and comorbidities, cxr   pt declines pain meds

## 2021-12-15 NOTE — ED PROVIDER NOTE - IV ALTEPLASE ADMIN OUTSIDE HIDDEN
Patient called to say that she called pharmacy and the script was not there. I called Monie at [facility] - she is calling in now and pharmacy will have ready in 10 minutes - Patient aware. Patient also stated that she is having someone drive her to and from MRI today  
show

## 2021-12-15 NOTE — ED ADULT TRIAGE NOTE - INTERPRETER NAME
SLP Note:    SLP evaluation/tx order received and attempted; however, pt off the floor for testing. Will re-attempt as pt able to participate/schedule allows.       Thank you for this referral,   Ynes Jacobs M.S., [address] [facility]  Speech-Language Pathologist Anjel Gracia

## 2021-12-15 NOTE — ED PROVIDER NOTE - OBJECTIVE STATEMENT
71F w/ PMHx of ESRD on daily PD, HTN p/w CP and SOB that has been going one for over a week.  Pt. sustained a mechanical trip and fall last week and landed on her left hand and chest wall w/ worsening of pain.  Hx obtained from son at bedside, who states pt. had similar sxs two years ago and had some "fluid in lungs."  Denies any f/c, sick contacts.  Fully vaccinated for covid, but hasn't received booster yet.  Denies hx of MI, CVA, cardiac stents.

## 2021-12-15 NOTE — ED ADULT TRIAGE NOTE - CHIEF COMPLAINT QUOTE
Chest pain since all day. Shortness of breath while getting peritoneal dialysis tonight. As per son she had been getting SOB for past few days but worse tonight.  Pt is wheezing. PMH of ESRD, HTN

## 2021-12-15 NOTE — ED ADULT NURSE REASSESSMENT NOTE - NS ED NURSE REASSESS COMMENT FT1
pt resting comfortably in bed, resp even and unlabored in NAD. Awaiting repeat lab results. vitals as per flow sheet.

## 2021-12-15 NOTE — ED PROVIDER NOTE - PATIENT PORTAL LINK FT
You can access the FollowMyHealth Patient Portal offered by [facility] by registering at the following website: [email or web address]. By joining [email or web address]’s FollowMyHealth portal, you will also be able to view your health information using other applications (apps) compatible with our system.

## 2021-12-15 NOTE — ED PROVIDER NOTE - PROGRESS NOTE DETAILS
Fabian PGY3 - W/u unremarkable, elevated trop and pro-BNP likely 2/2 kidney dysfunction.  Pt. continues to feel well.  Will dc w/ PCP and nephrology f/u.  Pt.'s son aware of results and agrees w/ plan.  All other questions and concerns have been addressed.  Pt. will be dc/d.  Pt. has nephrologist appt on Friday.

## 2022-04-20 ENCOUNTER — NON-APPOINTMENT (OUTPATIENT)
Age: 72
End: 2022-04-20

## 2022-04-20 ENCOUNTER — APPOINTMENT (OUTPATIENT)
Dept: VASCULAR SURGERY | Facility: CLINIC | Age: 72
End: 2022-04-20
Payer: MEDICAID

## 2022-04-20 VITALS
BODY MASS INDEX: 27.46 KG/M2 | HEIGHT: 63 IN | HEART RATE: 64 BPM | TEMPERATURE: 95.4 F | DIASTOLIC BLOOD PRESSURE: 81 MMHG | WEIGHT: 155 LBS | SYSTOLIC BLOOD PRESSURE: 159 MMHG

## 2022-04-20 PROCEDURE — 93985 DUP-SCAN HEMO COMPL BI STD: CPT

## 2022-04-20 PROCEDURE — 99204 OFFICE O/P NEW MOD 45 MIN: CPT

## 2022-04-20 NOTE — HISTORY OF PRESENT ILLNESS
[FreeTextEntry1] : 71 year old female with ESRD on peritoneal dialysis requesting conversion to hemodialysis and presents of evaluation for arteriovenous fistula creation.\par \par Nephrologist: Dr. Calderon\par \par Right hand dominant. No pacemaker or AICD.\par \par

## 2022-04-20 NOTE — PHYSICAL EXAM
[Respiratory Effort] : normal respiratory effort [Normal Rate and Rhythm] : normal rate and rhythm [2+] : left 2+ [Abdomen Tenderness] : ~T ~M No abdominal tenderness [Skin Ulcer] : no ulcer [Alert] : alert [Oriented to Place] : oriented to place [Oriented to Person] : oriented to person [Oriented to Time] : oriented to time [Calm] : calm [de-identified] : appears stated age [de-identified] : normocephalic, atraumatic [de-identified] : supple

## 2022-04-20 NOTE — ASSESSMENT
[FreeTextEntry1] : Problem #1 ESRD dependent on dialysis\par - Will schedule for LUE AVF creation\par - Procedure discussed at length including indications, risks, and benefits

## 2022-04-27 ENCOUNTER — OUTPATIENT (OUTPATIENT)
Dept: OUTPATIENT SERVICES | Facility: HOSPITAL | Age: 72
LOS: 1 days | End: 2022-04-27
Payer: MEDICAID

## 2022-04-27 VITALS
HEIGHT: 61 IN | TEMPERATURE: 98 F | DIASTOLIC BLOOD PRESSURE: 78 MMHG | SYSTOLIC BLOOD PRESSURE: 135 MMHG | OXYGEN SATURATION: 100 % | WEIGHT: 149.91 LBS | HEART RATE: 67 BPM | RESPIRATION RATE: 16 BRPM

## 2022-04-27 DIAGNOSIS — Z99.2 DEPENDENCE ON RENAL DIALYSIS: ICD-10-CM

## 2022-04-27 DIAGNOSIS — E11.9 TYPE 2 DIABETES MELLITUS WITHOUT COMPLICATIONS: ICD-10-CM

## 2022-04-27 DIAGNOSIS — Z49.02 ENCOUNTER FOR FITTING AND ADJUSTMENT OF PERITONEAL DIALYSIS CATHETER: Chronic | ICD-10-CM

## 2022-04-27 DIAGNOSIS — Z01.818 ENCOUNTER FOR OTHER PREPROCEDURAL EXAMINATION: ICD-10-CM

## 2022-04-27 DIAGNOSIS — N18.6 END STAGE RENAL DISEASE: ICD-10-CM

## 2022-04-27 DIAGNOSIS — Z98.890 OTHER SPECIFIED POSTPROCEDURAL STATES: Chronic | ICD-10-CM

## 2022-04-27 LAB
A1C WITH ESTIMATED AVERAGE GLUCOSE RESULT: 5.9 % — HIGH (ref 4–5.6)
ANION GAP SERPL CALC-SCNC: 21 MMOL/L — HIGH (ref 5–17)
BUN SERPL-MCNC: 34 MG/DL — HIGH (ref 7–23)
CALCIUM SERPL-MCNC: 10 MG/DL — SIGNIFICANT CHANGE UP (ref 8.4–10.5)
CHLORIDE SERPL-SCNC: 99 MMOL/L — SIGNIFICANT CHANGE UP (ref 96–108)
CO2 SERPL-SCNC: 21 MMOL/L — LOW (ref 22–31)
CREAT SERPL-MCNC: 9.26 MG/DL — HIGH (ref 0.5–1.3)
EGFR: 4 ML/MIN/1.73M2 — LOW
ESTIMATED AVERAGE GLUCOSE: 123 MG/DL — HIGH (ref 68–114)
GLUCOSE SERPL-MCNC: 143 MG/DL — HIGH (ref 70–99)
HCT VFR BLD CALC: 37.4 % — SIGNIFICANT CHANGE UP (ref 34.5–45)
HGB BLD-MCNC: 11.7 G/DL — SIGNIFICANT CHANGE UP (ref 11.5–15.5)
MCHC RBC-ENTMCNC: 30.2 PG — SIGNIFICANT CHANGE UP (ref 27–34)
MCHC RBC-ENTMCNC: 31.3 GM/DL — LOW (ref 32–36)
MCV RBC AUTO: 96.6 FL — SIGNIFICANT CHANGE UP (ref 80–100)
NRBC # BLD: 0 /100 WBCS — SIGNIFICANT CHANGE UP (ref 0–0)
PLATELET # BLD AUTO: 248 K/UL — SIGNIFICANT CHANGE UP (ref 150–400)
POTASSIUM SERPL-MCNC: 4 MMOL/L — SIGNIFICANT CHANGE UP (ref 3.5–5.3)
POTASSIUM SERPL-SCNC: 4 MMOL/L — SIGNIFICANT CHANGE UP (ref 3.5–5.3)
RBC # BLD: 3.87 M/UL — SIGNIFICANT CHANGE UP (ref 3.8–5.2)
RBC # FLD: 13.7 % — SIGNIFICANT CHANGE UP (ref 10.3–14.5)
SODIUM SERPL-SCNC: 141 MMOL/L — SIGNIFICANT CHANGE UP (ref 135–145)
WBC # BLD: 8.57 K/UL — SIGNIFICANT CHANGE UP (ref 3.8–10.5)
WBC # FLD AUTO: 8.57 K/UL — SIGNIFICANT CHANGE UP (ref 3.8–10.5)

## 2022-04-27 PROCEDURE — 80048 BASIC METABOLIC PNL TOTAL CA: CPT

## 2022-04-27 PROCEDURE — G0463: CPT

## 2022-04-27 PROCEDURE — 85027 COMPLETE CBC AUTOMATED: CPT

## 2022-04-27 PROCEDURE — 83036 HEMOGLOBIN GLYCOSYLATED A1C: CPT

## 2022-04-27 RX ORDER — LEVOTHYROXINE SODIUM 125 MCG
1 TABLET ORAL
Qty: 0 | Refills: 0 | DISCHARGE

## 2022-04-27 RX ORDER — ERGOCALCIFEROL 1.25 MG/1
1 CAPSULE ORAL
Qty: 0 | Refills: 0 | DISCHARGE

## 2022-04-27 RX ORDER — SODIUM BICARBONATE 1 MEQ/ML
1 SYRINGE (ML) INTRAVENOUS
Qty: 0 | Refills: 0 | DISCHARGE

## 2022-04-27 RX ORDER — OMEPRAZOLE 10 MG/1
1 CAPSULE, DELAYED RELEASE ORAL
Qty: 0 | Refills: 0 | DISCHARGE

## 2022-04-27 RX ORDER — SITAGLIPTIN 50 MG/1
1 TABLET, FILM COATED ORAL
Qty: 0 | Refills: 0 | DISCHARGE

## 2022-04-27 RX ORDER — CEFAZOLIN SODIUM 1 G
2000 VIAL (EA) INJECTION ONCE
Refills: 0 | Status: DISCONTINUED | OUTPATIENT
Start: 2022-05-06 | End: 2022-05-20

## 2022-04-27 NOTE — H&P PST ADULT - NSICDXPASTMEDICALHX_GEN_ALL_CORE_FT
PAST MEDICAL HISTORY:  Anemia Ferrous Sulfate supplement    DM (diabetes mellitus) Type 2 - Medications discontinued 1 month ago by PCP for improving HGA1C - Pt unsure of HGA1C at this time    ESRD (end stage renal disease) s/p Peritoneal Dialysis 6x/week overnight since 2020  Pending L arm AV fistula creation, possible graft with Dr. Maldonado on 22    HTN (hypertension)      (normal spontaneous vaginal delivery) x 3    Thyroid disease Hypothyroidism - on Levothyroxine

## 2022-04-27 NOTE — H&P PST ADULT - HISTORY OF PRESENT ILLNESS
71 year old Sinhala-speaking female with PMH HTN, Hypothyroidism, DMT2 (Meds D/C'ed 1 month ago by PCP for improving HGA1C) and ESRD s/p Peritoneal Dialysis (6/2020) reports that she is traveling back home to [facility] x 3 months and is unable to perform peritoneal dialysis in her home country. Pt now presents to PST for scheduled Left arm AV fistula creation, possible graft with Dr. Maldonado on 5/6/22.    Covid PCR test scheduled for 5/3/22 at [facility]  Covid vaccine Moderna 2nd dose received 3/11/21; Moderna Booster received 12/18/21  No recent hospitalizations over the last 3 months  Denies recent travel, exposure or Covid symptoms  71 year old Faroese-speaking female with PMH HTN, Hypothyroidism, DMT2 (Meds D/C'ed 1 month ago by PCP for improving HGA1C) and ESRD s/p Peritoneal Dialysis (6/2020) reports that she is traveling back home to [facility] and is unable to perform peritoneal dialysis in her home country. Pt now presents to PST for scheduled Left arm AV fistula creation, possible graft with Dr. Maldonado on 5/6/22.    Covid PCR test scheduled for 5/3/22 at [facility]  Covid vaccine Moderna 2nd dose received 3/11/21; Moderna Booster received 12/18/21  No recent hospitalizations over the last 3 months  Denies recent travel, exposure or Covid symptoms  71 year old Mongolian-speaking female with PMH HTN, Hypothyroidism, DMT2 (Meds D/C'ed 1 month ago by PCP for improving HGA1C) and ESRD s/p Peritoneal Dialysis (6/2020) reports that she is traveling back home to [facility] and is unable to perform peritoneal dialysis in her home country. Pt now presents to PST for scheduled Left arm AV fistula creation, possible graft with Dr. Maldoando on 5/6/22.    Pt does Peritoneal Dialysis Exchanges 6 nights per week - Son will discuss the plan for peritoneal dialysis the night before the surgery  [facility]  [phone]    Covid PCR test scheduled for 5/3/22 at [facility]  Covid vaccine Moderna 2nd dose received 3/11/21; Moderna Booster received 12/18/21  No recent hospitalizations over the last 3 months  Denies recent travel, exposure or Covid symptoms  71 year old Romansh-speaking female with PMH HTN, Hypothyroidism, DMT2 (Meds D/C'ed 1 month ago by PCP for improving HGA1C) and ESRD s/p Peritoneal Dialysis (6/2020) reports that she is traveling back home to [facility] and is unable to perform peritoneal dialysis in her home country. Pt now presents to PST for scheduled Left arm AV fistula creation, possible graft with Dr. Maldonado on 5/6/22.    Pt does Peritoneal Dialysis Exchanges 6 nights per week - Son will discuss the plan for peritoneal dialysis regarding the night before the surgery  [facility]  [phone]    Covid PCR test scheduled for 5/3/22 at [facility]  Covid vaccine Moderna 2nd dose received 3/11/21; Moderna Booster received 12/18/21  No recent hospitalizations over the last 3 months  Denies recent travel, exposure or Covid symptoms

## 2022-04-27 NOTE — H&P PST ADULT - PROBLEM SELECTOR PLAN 1
Pt is scheduled for a Left Arm AV fistula creation, possible graft with Dr. Maldonado on 5/6/22  Covid PCR test scheduled for 5/3/22 at [facility]  CBC, BMP ordered and obtained at [facility]  Most recent PCP visit from 3/2022 requested  Stat Venous Potassium level ordered DOS

## 2022-04-27 NOTE — H&P PST ADULT - NSICDXPASTSURGICALHX_GEN_ALL_CORE_FT
PAST SURGICAL HISTORY:  Encounter for peritoneal dialysis catheter insertion 6/2020    S/P excision of lipoma

## 2022-04-27 NOTE — H&P PST ADULT - OTHER CARE PROVIDERS
Dr. Calderon (Nephrologist) from [facility] in [place] [phone] - Last visit 2 weeks ago for regular F/U; Dr. Mir Williamson (Cardiologist) [phone] - Last visit 1/2021 as a work-up for kidney transplant list

## 2022-04-27 NOTE — H&P PST ADULT - REASON FOR ADMISSION
"I am having hemodialysis catheter put into her left arm." "I am having hemodialysis catheter put into my left arm."

## 2022-05-03 ENCOUNTER — OUTPATIENT (OUTPATIENT)
Dept: OUTPATIENT SERVICES | Facility: HOSPITAL | Age: 72
LOS: 1 days | End: 2022-05-03
Payer: MEDICAID

## 2022-05-03 DIAGNOSIS — Z49.02 ENCOUNTER FOR FITTING AND ADJUSTMENT OF PERITONEAL DIALYSIS CATHETER: Chronic | ICD-10-CM

## 2022-05-03 DIAGNOSIS — Z11.52 ENCOUNTER FOR SCREENING FOR COVID-19: ICD-10-CM

## 2022-05-03 DIAGNOSIS — Z98.890 OTHER SPECIFIED POSTPROCEDURAL STATES: Chronic | ICD-10-CM

## 2022-05-03 LAB — SARS-COV-2 RNA SPEC QL NAA+PROBE: SIGNIFICANT CHANGE UP

## 2022-05-03 PROCEDURE — U0005: CPT

## 2022-05-03 PROCEDURE — U0003: CPT

## 2022-05-03 PROCEDURE — C9803: CPT

## 2022-05-05 ENCOUNTER — TRANSCRIPTION ENCOUNTER (OUTPATIENT)
Age: 72
End: 2022-05-05

## 2022-05-06 ENCOUNTER — TRANSCRIPTION ENCOUNTER (OUTPATIENT)
Age: 72
End: 2022-05-06

## 2022-05-06 ENCOUNTER — APPOINTMENT (OUTPATIENT)
Dept: VASCULAR SURGERY | Facility: HOSPITAL | Age: 72
End: 2022-05-06

## 2022-05-06 ENCOUNTER — OUTPATIENT (OUTPATIENT)
Dept: INPATIENT UNIT | Facility: HOSPITAL | Age: 72
LOS: 1 days | Discharge: HOME | End: 2022-05-06
Payer: MEDICAID

## 2022-05-06 VITALS
TEMPERATURE: 98 F | WEIGHT: 149.91 LBS | HEART RATE: 67 BPM | SYSTOLIC BLOOD PRESSURE: 148 MMHG | HEIGHT: 60.98 IN | DIASTOLIC BLOOD PRESSURE: 75 MMHG | RESPIRATION RATE: 16 BRPM | OXYGEN SATURATION: 99 %

## 2022-05-06 VITALS
RESPIRATION RATE: 20 BRPM | HEART RATE: 65 BPM | OXYGEN SATURATION: 98 % | SYSTOLIC BLOOD PRESSURE: 129 MMHG | TEMPERATURE: 98 F | DIASTOLIC BLOOD PRESSURE: 70 MMHG

## 2022-05-06 DIAGNOSIS — Z49.02 ENCOUNTER FOR FITTING AND ADJUSTMENT OF PERITONEAL DIALYSIS CATHETER: Chronic | ICD-10-CM

## 2022-05-06 DIAGNOSIS — Z99.2 DEPENDENCE ON RENAL DIALYSIS: ICD-10-CM

## 2022-05-06 DIAGNOSIS — Z98.890 OTHER SPECIFIED POSTPROCEDURAL STATES: Chronic | ICD-10-CM

## 2022-05-06 DIAGNOSIS — N18.6 END STAGE RENAL DISEASE: ICD-10-CM

## 2022-05-06 LAB
BASE EXCESS BLDV CALC-SCNC: -4.4 MMOL/L — LOW (ref -2–2)
BLOOD GAS VENOUS - CREATININE: SIGNIFICANT CHANGE UP MG/DL (ref 0.5–1.3)
CA-I SERPL-SCNC: 1.05 MMOL/L — LOW (ref 1.15–1.33)
CHLORIDE BLDV-SCNC: 101 MMOL/L — SIGNIFICANT CHANGE UP (ref 96–108)
CO2 BLDV-SCNC: 22 MMOL/L — SIGNIFICANT CHANGE UP (ref 22–26)
GAS PNL BLDV: 133 MMOL/L — LOW (ref 136–145)
GAS PNL BLDV: SIGNIFICANT CHANGE UP
GAS PNL BLDV: SIGNIFICANT CHANGE UP
GLUCOSE BLDC GLUCOMTR-MCNC: 133 MG/DL — HIGH (ref 70–99)
GLUCOSE BLDV-MCNC: 150 MG/DL — HIGH (ref 70–99)
HCO3 BLDV-SCNC: 21 MMOL/L — LOW (ref 22–29)
HCT VFR BLDA CALC: 34 % — LOW (ref 34.5–46.5)
HGB BLD CALC-MCNC: 11.3 G/DL — LOW (ref 11.7–16.1)
LACTATE BLDV-MCNC: 1.3 MMOL/L — SIGNIFICANT CHANGE UP (ref 0.7–2)
PCO2 BLDV: 39 MMHG — SIGNIFICANT CHANGE UP (ref 39–42)
PH BLDV: 7.34 — SIGNIFICANT CHANGE UP (ref 7.32–7.43)
PO2 BLDV: 49 MMHG — HIGH (ref 25–45)
POTASSIUM BLDV-SCNC: 3.9 MMOL/L — SIGNIFICANT CHANGE UP (ref 3.5–5.1)
SAO2 % BLDV: 84.2 % — SIGNIFICANT CHANGE UP (ref 67–88)

## 2022-05-06 PROCEDURE — 83605 ASSAY OF LACTIC ACID: CPT

## 2022-05-06 PROCEDURE — 85018 HEMOGLOBIN: CPT

## 2022-05-06 PROCEDURE — 85014 HEMATOCRIT: CPT

## 2022-05-06 PROCEDURE — 36821 AV FUSION DIRECT ANY SITE: CPT

## 2022-05-06 PROCEDURE — 84132 ASSAY OF SERUM POTASSIUM: CPT

## 2022-05-06 PROCEDURE — 82962 GLUCOSE BLOOD TEST: CPT

## 2022-05-06 PROCEDURE — 82947 ASSAY GLUCOSE BLOOD QUANT: CPT

## 2022-05-06 PROCEDURE — 82330 ASSAY OF CALCIUM: CPT

## 2022-05-06 PROCEDURE — 84295 ASSAY OF SERUM SODIUM: CPT

## 2022-05-06 PROCEDURE — C1889: CPT

## 2022-05-06 PROCEDURE — 82565 ASSAY OF CREATININE: CPT

## 2022-05-06 PROCEDURE — 36821 AV FUSION DIRECT ANY SITE: CPT | Mod: LT

## 2022-05-06 PROCEDURE — 82435 ASSAY OF BLOOD CHLORIDE: CPT

## 2022-05-06 PROCEDURE — 82803 BLOOD GASES ANY COMBINATION: CPT

## 2022-05-06 DEVICE — SURGICEL FIBRILLAR 2 X 4": Type: IMPLANTABLE DEVICE | Site: LEFT | Status: FUNCTIONAL

## 2022-05-06 RX ORDER — CHLORHEXIDINE GLUCONATE 213 G/1000ML
1 SOLUTION TOPICAL ONCE
Refills: 0 | Status: COMPLETED | OUTPATIENT
Start: 2022-05-06 | End: 2022-05-06

## 2022-05-06 RX ORDER — ONDANSETRON 8 MG/1
4 TABLET, FILM COATED ORAL ONCE
Refills: 0 | Status: DISCONTINUED | OUTPATIENT
Start: 2022-05-06 | End: 2022-05-06

## 2022-05-06 RX ORDER — FENTANYL CITRATE 50 UG/ML
25 INJECTION INTRAVENOUS
Refills: 0 | Status: DISCONTINUED | OUTPATIENT
Start: 2022-05-06 | End: 2022-05-06

## 2022-05-06 RX ORDER — LIDOCAINE HCL 20 MG/ML
0.2 VIAL (ML) INJECTION ONCE
Refills: 0 | Status: COMPLETED | OUTPATIENT
Start: 2022-05-06 | End: 2022-05-06

## 2022-05-06 RX ORDER — ACETAMINOPHEN 500 MG
1000 TABLET ORAL ONCE
Refills: 0 | Status: COMPLETED | OUTPATIENT
Start: 2022-05-06 | End: 2022-05-06

## 2022-05-06 RX ORDER — FENTANYL CITRATE 50 UG/ML
50 INJECTION INTRAVENOUS
Refills: 0 | Status: DISCONTINUED | OUTPATIENT
Start: 2022-05-06 | End: 2022-05-06

## 2022-05-06 RX ORDER — SODIUM CHLORIDE 9 MG/ML
3 INJECTION INTRAMUSCULAR; INTRAVENOUS; SUBCUTANEOUS EVERY 8 HOURS
Refills: 0 | Status: DISCONTINUED | OUTPATIENT
Start: 2022-05-06 | End: 2022-05-06

## 2022-05-06 RX ADMIN — Medication 400 MILLIGRAM(S): at 10:51

## 2022-05-06 RX ADMIN — CHLORHEXIDINE GLUCONATE 1 APPLICATION(S): 213 SOLUTION TOPICAL at 06:15

## 2022-05-06 NOTE — ASU PREOP CHECKLIST - PATIENT'S PERSONAL PROPERTY REMOVED
Patient will place in belonging bag prior to admission/glasses Patient will place in belonging bag prior to going into the OR/glasses

## 2022-05-06 NOTE — ASU PATIENT PROFILE, ADULT - FALL HARM RISK - UNIVERSAL INTERVENTIONS
Bed in lowest position, wheels locked, appropriate side rails in place/Call bell, personal items and telephone in reach/Instruct patient to call for assistance before getting out of bed or chair/Non-slip footwear when patient is out of bed/Mark to call system/Physically safe environment - no spills, clutter or unnecessary equipment/Purposeful Proactive Rounding/Room/bathroom lighting operational, light cord in reach

## 2022-05-06 NOTE — ASU DISCHARGE PLAN (ADULT/PEDIATRIC) - CARE PROVIDER_API CALL
Alfonso Maldonado)  Surgery  Vascular  [address] [place] [address]  Phone: [phone]  Fax: [phone]  Follow Up Time:

## 2022-05-06 NOTE — ASU DISCHARGE PLAN (ADULT/PEDIATRIC) - NURSING INSTRUCTIONS
you received acetaminophen 1000mg at 1050am,   Next dose of TYLENOL may be taken at or after 450 PM if needed. DO NOT take any additional products containing TYLENOL or ACETAMINOPHEN, such as VICODIN, PERCOCET, NORCO, EXCEDRIN, and any over-the-counter cold medications until this time. DO NOT CONSUME MORE THAN 7494-8314 MG of TYLENOL (acetaminophen) in a 24-hour period.

## 2022-05-06 NOTE — ASU DISCHARGE PLAN (ADULT/PEDIATRIC) - PROCEDURE
[address]  [address]  [facility] [address]    Shadi Tobias .          [address] [facility] [address]      Dear Shadi,         4/22/2021        On 4/15/21, Lesley HUFFMAN wrote a referral for you to see an Endocrinologist.  Our referral department has not been able to reach you by phone.     Your care is important to us.  Please call  [phone], to schedule your appointment.       Sincerely,    [facility] [place] MEDICAL OFFICE [facility] ENDOCRINOLOGY-[facility] [address]  [address]  [facility] [id number]  
LUE AV Fistula

## 2022-05-06 NOTE — ASU PREOP CHECKLIST - HAIR REMOVAL
What Is The Reason For Today's Visit?: History of Non-Melanoma Skin Cancer How Many Skin Cancers Have You Had?: more than one hair removal not indicated

## 2022-05-06 NOTE — ASU DISCHARGE PLAN (ADULT/PEDIATRIC) - NS MD DC FALL RISK RISK
For information on Fall & Injury Prevention, visit: [email or web address] OR  [email or web address] OR  https://www.cdc.gov/steadi/patient.html

## 2022-05-06 NOTE — ASU PATIENT PROFILE, ADULT - PATIENT/CAREGIVER ACCEPTED INTERPRETER SERVICES
[General Appearance - Alert] : alert [Hearing Threshold Finger Rub Not [place]] : hearing was normal [] : no respiratory distress yes [Skin Color & Pigmentation] : normal skin color and pigmentation [Oriented To Time, Place, And Person] : oriented to person, place, and time

## 2022-05-06 NOTE — PRE-ANESTHESIA EVALUATION ADULT - NSRADCARDRESULTSFT_GEN_ALL_CORE
< from: Transthoracic Echocardiogram (21 @ 12:43) >      Patient name: CELIO DON  YOB: 1950   Age: 70 (F)   MR#: [id number]  Study Date: 2021  Location: O/PSonographer: Geraldine Baumann [facility]  Study quality: Technically good  Referring Physician: Mir Williamson MD  Blood Pressure: 127/70 mmHg  Height: 152 cm  Weight: 61 kg  BSA: 1.6 m2  ------------------------------------------------------------------------  PROCEDURE: Transthoracic echocardiogram with 2-D, M-Mode  and complete spectral and color flow Doppler.  INDICATION: Essential (primary) hypertension (I10)  ------------------------------------------------------------------------  DIMENSIONS:  Dimensions:     Normal Values:  LA:     3.9 cm    2.0 - 4.0 cm  Ao:     3.6 cm    2.0 - 3.8 cm  SEPTUM: 1.1 cm    0.6 -1.2 cm  PWT:    1.1 cm    0.6 - 1.1 cm  LVIDd:  4.5 cm    3.0 - 5.6 cm  LVIDs:  2.9 cm    1.8 - 4.0 cm  Derived Variables:  LVMI: 111 g/m2  RWT: 0.48  Fractional short: 36 %  Ejection Fraction (Teicholtz): 65 %  ------------------------------------------------------------------------  OBSERVATIONS:  Mitral Valve: Mitral annular calcification and calcified  mitral leaflets with normal diastolic opening. Mild mitral  regurgitation.  Aortic Root: Normal aortic root.  Aortic Valve: Calcified trileaflet aortic valve with normal  opening.  Left Atrium: Normal left atrium.  LA volume index = 34  cc/m2.  Left Ventricle: Endocardium not well visualized; grossly  normal left ventricular systolic function. Moderate  concentric left ventricular hypertrophy.  Right Heart: Normal right atrium. Normal right ventricular  size and function. Normal tricuspid valve. Minimal  tricuspid regurgitation. Normal pulmonic valve.  Minimal  pulmonic regurgitation.  Pericardium/PleuraNormal pericardium with no pericardial  effusion.  ------------------------------------------------------------------------  CONCLUSIONS:  1. Mitral annular calcification and calcified mitral  leaflets with normal diastolic opening. Mild mitral  regurgitation.  2. Calcified trileaflet aortic valve with normal opening.  3. Moderate concentric left ventricular hypertrophy.  4. Endocardium not well visualized; grossly normal left  ventricular systolic function.  5. Normal right ventricular size and function.  ------------------------------------------------------------------------  Confirmed on  2021 - 18:09:05 by SREEDHAR Pandey  ------------------------------------------------------------------------    < end of copied text >        < from: Nuclear Stress Test-Pharmacologic (21 @ 14:19) >      PATIENT: CELIO DON  : 1950   AGE: 70 (F)   MR#: [id number]  STUDY DATE: 2021  LOCATION: O/P  REF. PHYSICIAN(S): Mir Williamson MD  FELLOW: Evonne Harris NP, KANG  ------------------------------------------------------------------------  TYPE OF TEST: Stress Pharmacologic  INDICATION: Encounter for preprocedural cardiovascular  examination (Z01.810)  ------------------------------------------------------------------------  HISTORY:  CARDIAC HISTORY: 70 years old female withPMH of HTN, HLD,  DM presents for cardiac evaluation prior to renal  transplant.  OTHER HISTORY: ESRD on PD, thyroid disorder, anemia  RISK FACTORS: Diabetes, Elevated Cholesterol,  Hypertension, Post Menopausal  MEDICATIONS: Norvasc, Lipitor, Alogliptin, Bisoprolol  Fumarate, Calcitriiol, Ferrous, Lasix, Zofran, Synthroid  ------------------------------------------------------------------------  BASELINE ELECTROCARDIOGRAM:  Rhythm: Normal Sinus Rhythm - 62 BPM  ------------------------------------------------------------------------  HEMODYNAMIC PARAMETERS:                                      HR      BP  Baseline  Pre-Injection             62  116/59  00:00     Inject Regadenoson        62  00:30     Post Injection            61  01:00     Post Injection            83  116/63  02:00     Post Injection            82  113/62  03:00     Post Injection            83  117/64  04:00     Post Injection            83  118/63  05:00     Recovery                  78  107/57  06:00     Recovery              77  102/60  07:00     Recovery                  78  08:00     Recovery                  76  106/58  09:00     Recovery                  75  108/57  ------------------------------------------------------------------------  Agent: Regadenoson 0.4 mg/5 ml NS. injected over 10 sec.  Aminophylline: 75 mg  HR: Baseline HR: 62 bpm   Peak HR: 83 bpm (56% of MPHR)  MPHR: 148 bpm   85% of MPHR: 126 bpm  BP: Baseline BP: 116/59 mmHg   Peak BP: 118/63 mmHg   Peak  RPP: 9794 (Rate Pressure Product)  Last Caffeine intake: 12 hrs  Terminated: Completion of protocol  ------------------------------------------------------------------------  SYMPTOMS/FINDINGS:  Symptoms:  no chest pain  Chest pain: No chest pain with administration of  Regadenoson  Treatment: Aminophylline 75mg IV given due to vomitting.  ------------------------------------------------------------------------  ECG ABNORMALITIES DURING/AFTER STRESS:   Abnormalities: None  ------------------------------------------------------------------------  NEW ARRHYTHMIAS DEVELOPED DURING/AFTER STRESS:  None  ------------------------------------------------------------------------  STRESS TEST IMPRESSIONS:  Chest Pain: No chest pain with administration of  Regadenoson.  Symptom:  no chest pain.  HR Response: Appropriate.  BP Response: Appropriate.  Heart Rhythm: Normal Sinus Rhythm - 62 BPM.  ECG Abnormalities: None.  Arrhythmia: None.  ------------------------------------------------------------------------  PROCEDURE:  7.9 mCi of Tc 99m Tetrofosmin were injected during stress  protocol. Approximately 45 minutes later, tomographic  images were obtained in a 180 degree arc from right  anterior oblique to left anterior oblique with 64 stops.  The tomographic slices were reconstructed in 3 orthogonal  planes (short axis, horizontal long axis and vertical long  axis).  Interpretation was performed both by visual and  quantitative analysis.  Stress images were acquired using CZT-based system with  pinhole collimation (BUILD 530 c, Crystalplex),  and reconstructed using MLEM algorithm. Images were  re-acquired with the patient in a prone position.  ------------------------------------------------------------------------  NUCLEAR FINDINGS:  The left ventricle was small in size. Normal myocardial  perfusion scan,with no evidence of infarction or inducible  ischemia.  ------------------------------------------------------------------------  GATED ANALYSIS:  Post-stress gated wall motion analysis was performed (LVEF  > 70%;LVEDV = 53 ml.), revealing normal LV function.  ------------------------------------------------------------------------  IMPRESSIONS:Normal Study  * The left ventricle was small in size.  * Tracer uptake was homogeneous throughout the left  ventricle.  * Normal study; no evidence for myocardial infarction or  ischemia.  * Gated wall motion analysis was performed, and shows  normal wall motion.  *** No previous Nuclear/Stress exam.  ------------------------------------------------------------------------  Confirmed on  2021 - 15:20:08 by Sharon Zhou MD  ------------------------------------------------------------------------    < end of copied text >

## 2022-05-06 NOTE — PRE-ANESTHESIA EVALUATION ADULT - NSANTHPMHFT_GEN_ALL_CORE
71 year old Slovak-speaking female with PMH HTN, Hypothyroidism, DMT2 (Meds D/C'ed 1 month ago by PCP for improving HGA1C) and ESRD s/p Peritoneal Dialysis (6/2020) reports that she is traveling back home to [facility] and is unable to perform peritoneal dialysis in her home country. Pt now presents to PST for scheduled Left arm AV fistula creation, possible graft     Does PD 6x/week 71 year old Czech-speaking female with PMH HTN, Hypothyroidism, DMT2 (Meds D/C'ed 1 month ago by PCP for improving HGA1C) and ESRD s/p Peritoneal Dialysis (6/2020) reports that she is traveling back home to [facility] and is unable to perform peritoneal dialysis in her home country. Pt now presents to PST for scheduled Left arm AV fistula creation, possible graft     Does PD 6x/week    Pt refused . Family member at bedside translating for patient. Pt endorses good functional status, denies CP, SOB, nerve pain/issues.

## 2022-05-09 PROBLEM — E07.9 DISORDER OF THYROID, UNSPECIFIED: Chronic | Status: ACTIVE | Noted: 2020-06-12

## 2022-05-09 PROBLEM — D64.9 ANEMIA, UNSPECIFIED: Chronic | Status: ACTIVE | Noted: 2020-06-12

## 2022-05-09 PROBLEM — E11.9 TYPE 2 DIABETES MELLITUS WITHOUT COMPLICATIONS: Chronic | Status: ACTIVE | Noted: 2020-06-12

## 2022-05-18 ENCOUNTER — APPOINTMENT (OUTPATIENT)
Dept: VASCULAR SURGERY | Facility: CLINIC | Age: 72
End: 2022-05-18
Payer: MEDICAID

## 2022-05-18 VITALS
TEMPERATURE: 98 F | WEIGHT: 155 LBS | SYSTOLIC BLOOD PRESSURE: 157 MMHG | BODY MASS INDEX: 27.46 KG/M2 | HEIGHT: 63 IN | DIASTOLIC BLOOD PRESSURE: 73 MMHG | HEART RATE: 65 BPM

## 2022-05-18 PROCEDURE — 99024 POSTOP FOLLOW-UP VISIT: CPT

## 2022-05-18 NOTE — PHYSICAL EXAM
[Respiratory Effort] : normal respiratory effort [Normal Rate and Rhythm] : normal rate and rhythm [2+] : left 2+ [Abdomen Tenderness] : ~T ~M No abdominal tenderness [Skin Ulcer] : no ulcer [Alert] : alert [Oriented to Person] : oriented to person [Oriented to Place] : oriented to place [Oriented to Time] : oriented to time [Calm] : calm [de-identified] : appears stated age [de-identified] : normocephalic, atraumatic [de-identified] : supple [FreeTextEntry1] : palpable thrill throughout left upper arm AVF

## 2022-05-18 NOTE — DISCUSSION/SUMMARY
[FreeTextEntry1] : Problem #1 Long term dialysis access\par - Continue to use PD catheter\par - s/p left brachiobasilic AVF creation\par - will return in one month for LAZARA\par - will need 2nd stage BVT \par

## 2022-05-18 NOTE — REASON FOR VISIT
[de-identified] : Left brachiobasilic AVF [de-identified] : 05/06/22 [de-identified] : Patient doing well denies complaints. [Family Member] : family member

## 2022-05-23 NOTE — PROGRESS NOTE ADULT - PROBLEM/PLAN-9
· Currently being worked up by GI due to 4-6 episodes of loose stool daily   · SIBO testing negative   · Stool studies including Cdiff have not yet been completed outpatient - stool ordered for inpatient and pt maintained on contact precautions until resulted DISPLAY PLAN FREE TEXT

## 2022-06-22 ENCOUNTER — APPOINTMENT (OUTPATIENT)
Dept: VASCULAR SURGERY | Facility: CLINIC | Age: 72
End: 2022-06-22
Payer: MEDICAID

## 2022-06-22 VITALS
HEART RATE: 74 BPM | BODY MASS INDEX: 27.64 KG/M2 | WEIGHT: 156 LBS | TEMPERATURE: 96.8 F | SYSTOLIC BLOOD PRESSURE: 150 MMHG | HEIGHT: 63 IN | DIASTOLIC BLOOD PRESSURE: 73 MMHG

## 2022-06-22 PROCEDURE — 93990 DOPPLER FLOW TESTING: CPT

## 2022-06-22 PROCEDURE — 99024 POSTOP FOLLOW-UP VISIT: CPT

## 2022-06-22 RX ORDER — LEVOTHYROXINE SODIUM 0.07 MG/1
75 TABLET ORAL
Qty: 30 | Refills: 0 | Status: ACTIVE | COMMUNITY
Start: 2022-06-08

## 2022-06-22 RX ORDER — BUPROPION HYDROCHLORIDE 100 MG/1
100 TABLET, FILM COATED ORAL
Qty: 60 | Refills: 0 | Status: ACTIVE | COMMUNITY
Start: 2022-06-01

## 2022-06-22 RX ORDER — GABAPENTIN 300 MG/1
300 CAPSULE ORAL
Qty: 30 | Refills: 0 | Status: ACTIVE | COMMUNITY
Start: 2022-06-02

## 2022-06-22 RX ORDER — CALCITRIOL 0.5 UG/1
0.5 CAPSULE, LIQUID FILLED ORAL
Qty: 30 | Refills: 0 | Status: ACTIVE | COMMUNITY
Start: 2022-06-08

## 2022-06-22 NOTE — REASON FOR VISIT
[Family Member] : family member [de-identified] : Left brachiobasilic AVF creation [de-identified] : 05/06/22 [de-identified] : D [de-identified] : Denies complaints doing well.

## 2022-06-22 NOTE — PHYSICAL EXAM
[Respiratory Effort] : normal respiratory effort [Normal Rate and Rhythm] : normal rate and rhythm [2+] : left 2+ [Abdomen Tenderness] : ~T ~M No abdominal tenderness [Skin Ulcer] : no ulcer [Alert] : alert [Oriented to Person] : oriented to person [Oriented to Place] : oriented to place [Oriented to Time] : oriented to time [Calm] : calm [de-identified] : appears stated age [de-identified] : normocephalic, atraumatic [de-identified] : supple [FreeTextEntry1] : palpable thrill throughout left upper arm AVF

## 2022-06-22 NOTE — DISCUSSION/SUMMARY
[FreeTextEntry1] : Problem #1 Dialysis access\par - LAZARA demonstrates 7mm fistula with excellent volume flow\par - Will schedule for left basilic vein transposition

## 2022-07-01 ENCOUNTER — OUTPATIENT (OUTPATIENT)
Dept: OUTPATIENT SERVICES | Facility: HOSPITAL | Age: 72
LOS: 1 days | End: 2022-07-01
Payer: MEDICAID

## 2022-07-01 VITALS
HEART RATE: 72 BPM | DIASTOLIC BLOOD PRESSURE: 82 MMHG | WEIGHT: 143.96 LBS | HEIGHT: 61 IN | TEMPERATURE: 98 F | RESPIRATION RATE: 16 BRPM | OXYGEN SATURATION: 97 % | SYSTOLIC BLOOD PRESSURE: 130 MMHG

## 2022-07-01 DIAGNOSIS — Z99.2 DEPENDENCE ON RENAL DIALYSIS: ICD-10-CM

## 2022-07-01 DIAGNOSIS — N18.6 END STAGE RENAL DISEASE: ICD-10-CM

## 2022-07-01 DIAGNOSIS — Z98.890 OTHER SPECIFIED POSTPROCEDURAL STATES: Chronic | ICD-10-CM

## 2022-07-01 DIAGNOSIS — Z49.02 ENCOUNTER FOR FITTING AND ADJUSTMENT OF PERITONEAL DIALYSIS CATHETER: Chronic | ICD-10-CM

## 2022-07-01 DIAGNOSIS — I10 ESSENTIAL (PRIMARY) HYPERTENSION: ICD-10-CM

## 2022-07-01 DIAGNOSIS — Z01.818 ENCOUNTER FOR OTHER PREPROCEDURAL EXAMINATION: ICD-10-CM

## 2022-07-01 DIAGNOSIS — E11.9 TYPE 2 DIABETES MELLITUS WITHOUT COMPLICATIONS: ICD-10-CM

## 2022-07-01 LAB
A1C WITH ESTIMATED AVERAGE GLUCOSE RESULT: 5.9 % — HIGH (ref 4–5.6)
ANION GAP SERPL CALC-SCNC: 19 MMOL/L — HIGH (ref 5–17)
BUN SERPL-MCNC: 28 MG/DL — HIGH (ref 7–23)
CALCIUM SERPL-MCNC: 8.8 MG/DL — SIGNIFICANT CHANGE UP (ref 8.4–10.5)
CHLORIDE SERPL-SCNC: 96 MMOL/L — SIGNIFICANT CHANGE UP (ref 96–108)
CO2 SERPL-SCNC: 24 MMOL/L — SIGNIFICANT CHANGE UP (ref 22–31)
CREAT SERPL-MCNC: 8.83 MG/DL — HIGH (ref 0.5–1.3)
EGFR: 4 ML/MIN/1.73M2 — LOW
ESTIMATED AVERAGE GLUCOSE: 123 MG/DL — HIGH (ref 68–114)
GLUCOSE SERPL-MCNC: 155 MG/DL — HIGH (ref 70–99)
HCT VFR BLD CALC: 33.4 % — LOW (ref 34.5–45)
HGB BLD-MCNC: 10.7 G/DL — LOW (ref 11.5–15.5)
MCHC RBC-ENTMCNC: 29.8 PG — SIGNIFICANT CHANGE UP (ref 27–34)
MCHC RBC-ENTMCNC: 32 GM/DL — SIGNIFICANT CHANGE UP (ref 32–36)
MCV RBC AUTO: 93 FL — SIGNIFICANT CHANGE UP (ref 80–100)
NRBC # BLD: 0 /100 WBCS — SIGNIFICANT CHANGE UP (ref 0–0)
PLATELET # BLD AUTO: 262 K/UL — SIGNIFICANT CHANGE UP (ref 150–400)
POTASSIUM SERPL-MCNC: 3.8 MMOL/L — SIGNIFICANT CHANGE UP (ref 3.5–5.3)
POTASSIUM SERPL-SCNC: 3.8 MMOL/L — SIGNIFICANT CHANGE UP (ref 3.5–5.3)
RBC # BLD: 3.59 M/UL — LOW (ref 3.8–5.2)
RBC # FLD: 13.2 % — SIGNIFICANT CHANGE UP (ref 10.3–14.5)
SODIUM SERPL-SCNC: 139 MMOL/L — SIGNIFICANT CHANGE UP (ref 135–145)
WBC # BLD: 9.99 K/UL — SIGNIFICANT CHANGE UP (ref 3.8–10.5)
WBC # FLD AUTO: 9.99 K/UL — SIGNIFICANT CHANGE UP (ref 3.8–10.5)

## 2022-07-01 PROCEDURE — 83036 HEMOGLOBIN GLYCOSYLATED A1C: CPT

## 2022-07-01 PROCEDURE — 80048 BASIC METABOLIC PNL TOTAL CA: CPT

## 2022-07-01 PROCEDURE — G0463: CPT

## 2022-07-01 PROCEDURE — 85027 COMPLETE CBC AUTOMATED: CPT

## 2022-07-01 RX ORDER — ATORVASTATIN CALCIUM 80 MG/1
1 TABLET, FILM COATED ORAL
Qty: 0 | Refills: 0 | DISCHARGE

## 2022-07-01 NOTE — H&P PST ADULT - HISTORY OF PRESENT ILLNESS
71 year old Azeri-speaking female with PMH HTN, Hypothyroidism, DMT2 (Meds D/C'ed  in 3/2022 by PCP for improving HGA1C) and ESRD s/p Peritoneal Dialysis (6/2020) reports that she is traveling back home to [facility] and is unable to perform peritoneal dialysis in her home country. Pt had Left arm AV fistula creation with Dr. Maldonado on 5/6/22.  had f/u vascular consult- scheduled for stage 2 left arm basilic vein transposition on 7/15/22    Pt does Peritoneal Dialysis Exchanges 6 nights per week - Son will discuss the plan for peritoneal dialysis regarding the night before the surgery  [facility]  [phone]    **Covid PCR test scheduled for 7/12/22 at [facility]  Covid vaccine Moderna 2nd dose received 3/11/21; Moderna Booster received 12/18/21  **Pt denies any fever, chills, CP/SOB or any recent hospitalizations over the last 3 months  Denies recent travel, exposure or Covid symptoms

## 2022-07-01 NOTE — H&P PST ADULT - PROBLEM SELECTOR PLAN 1
stage 2 left arm basilic vein transposition  Labs- CBC, BMP, Hb A1c  Pre op instructions discussed with son- verbalized understanding  PCP evaluation on 7/2/22

## 2022-07-01 NOTE — H&P PST ADULT - OTHER CARE PROVIDERS
Dr. Calderon (Nephrologist) from [facility] in [place] [phone] - Last visit 2 weeks ago for regular F/U; Dr. Mir Williamson (Cardiologist) [phone] - Last visit 1/2021 as a work-up for kidney transplant list

## 2022-07-01 NOTE — H&P PST ADULT - NSICDXPASTSURGICALHX_GEN_ALL_CORE_FT
PAST SURGICAL HISTORY:  Encounter for peritoneal dialysis catheter insertion 6/2020    S/P arteriovenous (AV) fistula creation left arm 5/6/2022    S/P excision of lipoma

## 2022-07-01 NOTE — H&P PST ADULT - NSICDXPASTMEDICALHX_GEN_ALL_CORE_FT
PAST MEDICAL HISTORY:  Anemia Ferrous Sulfate supplement    DM (diabetes mellitus) Type 2 - Medications discontinued 1 month ago by PCP for improving HGA1C - Pt unsure of HGA1C at this time    ESRD (end stage renal disease) s/p Peritoneal Dialysis 6x/week overnight since 2020   L arm AV fistula creation, possible graft with Dr. Maldonado on 22    HTN (hypertension)      (normal spontaneous vaginal delivery) x 3    Thyroid disease Hypothyroidism - on Levothyroxine

## 2022-07-01 NOTE — H&P PST ADULT - RESPIRATORY
clear to auscultation bilaterally/no wheezes/no rales/no rhonchi/no respiratory distress/no use of accessory muscles

## 2022-07-01 NOTE — H&P PST ADULT - FALL HARM RISK - UNIVERSAL INTERVENTIONS
Bed in lowest position, wheels locked, appropriate side rails in place/Call bell, personal items and telephone in reach/Instruct patient to call for assistance before getting out of bed or chair/Non-slip footwear when patient is out of bed/[place] to call system/Physically safe environment - no spills, clutter or unnecessary equipment/Purposeful Proactive Rounding/Room/bathroom lighting operational, light cord in reach

## 2022-07-05 PROBLEM — N18.6 END STAGE RENAL DISEASE: Chronic | Status: ACTIVE | Noted: 2020-06-12

## 2022-07-12 ENCOUNTER — OUTPATIENT (OUTPATIENT)
Dept: OUTPATIENT SERVICES | Facility: HOSPITAL | Age: 72
LOS: 1 days | End: 2022-07-12
Payer: MEDICAID

## 2022-07-12 DIAGNOSIS — Z11.52 ENCOUNTER FOR SCREENING FOR COVID-19: ICD-10-CM

## 2022-07-12 DIAGNOSIS — Z49.02 ENCOUNTER FOR FITTING AND ADJUSTMENT OF PERITONEAL DIALYSIS CATHETER: Chronic | ICD-10-CM

## 2022-07-12 DIAGNOSIS — Z98.890 OTHER SPECIFIED POSTPROCEDURAL STATES: Chronic | ICD-10-CM

## 2022-07-12 LAB — SARS-COV-2 RNA SPEC QL NAA+PROBE: SIGNIFICANT CHANGE UP

## 2022-07-12 PROCEDURE — C9803: CPT

## 2022-07-12 PROCEDURE — U0003: CPT

## 2022-07-12 PROCEDURE — U0005: CPT

## 2022-07-15 ENCOUNTER — OUTPATIENT (OUTPATIENT)
Dept: OUTPATIENT SERVICES | Facility: HOSPITAL | Age: 72
LOS: 1 days | End: 2022-07-15
Payer: MEDICAID

## 2022-07-15 DIAGNOSIS — Z98.890 OTHER SPECIFIED POSTPROCEDURAL STATES: Chronic | ICD-10-CM

## 2022-07-15 DIAGNOSIS — Z49.02 ENCOUNTER FOR FITTING AND ADJUSTMENT OF PERITONEAL DIALYSIS CATHETER: Chronic | ICD-10-CM

## 2022-07-15 PROCEDURE — 84132 ASSAY OF SERUM POTASSIUM: CPT

## 2022-07-15 PROCEDURE — 82962 GLUCOSE BLOOD TEST: CPT

## 2022-07-18 DIAGNOSIS — N18.6 END STAGE RENAL DISEASE: ICD-10-CM

## 2022-07-18 DIAGNOSIS — Z99.2 DEPENDENCE ON RENAL DIALYSIS: ICD-10-CM

## 2022-07-22 ENCOUNTER — OUTPATIENT (OUTPATIENT)
Dept: OUTPATIENT SERVICES | Facility: HOSPITAL | Age: 72
LOS: 1 days | End: 2022-07-22
Payer: MEDICAID

## 2022-07-22 DIAGNOSIS — Z11.52 ENCOUNTER FOR SCREENING FOR COVID-19: ICD-10-CM

## 2022-07-22 DIAGNOSIS — Z49.02 ENCOUNTER FOR FITTING AND ADJUSTMENT OF PERITONEAL DIALYSIS CATHETER: Chronic | ICD-10-CM

## 2022-07-22 DIAGNOSIS — Z98.890 OTHER SPECIFIED POSTPROCEDURAL STATES: Chronic | ICD-10-CM

## 2022-07-22 LAB — SARS-COV-2 RNA SPEC QL NAA+PROBE: SIGNIFICANT CHANGE UP

## 2022-07-22 PROCEDURE — C9803: CPT

## 2022-07-22 PROCEDURE — U0003: CPT

## 2022-07-22 PROCEDURE — U0005: CPT

## 2022-07-24 ENCOUNTER — TRANSCRIPTION ENCOUNTER (OUTPATIENT)
Age: 72
End: 2022-07-24

## 2022-07-25 ENCOUNTER — APPOINTMENT (OUTPATIENT)
Dept: VASCULAR SURGERY | Facility: HOSPITAL | Age: 72
End: 2022-07-25

## 2022-07-25 ENCOUNTER — OUTPATIENT (OUTPATIENT)
Dept: OUTPATIENT SERVICES | Facility: HOSPITAL | Age: 72
LOS: 1 days | End: 2022-07-25
Payer: MEDICAID

## 2022-07-25 ENCOUNTER — TRANSCRIPTION ENCOUNTER (OUTPATIENT)
Age: 72
End: 2022-07-25

## 2022-07-25 VITALS
HEART RATE: 80 BPM | TEMPERATURE: 98 F | SYSTOLIC BLOOD PRESSURE: 154 MMHG | DIASTOLIC BLOOD PRESSURE: 70 MMHG | RESPIRATION RATE: 18 BRPM | OXYGEN SATURATION: 97 %

## 2022-07-25 VITALS
SYSTOLIC BLOOD PRESSURE: 153 MMHG | HEART RATE: 101 BPM | TEMPERATURE: 98 F | RESPIRATION RATE: 18 BRPM | WEIGHT: 143.96 LBS | DIASTOLIC BLOOD PRESSURE: 75 MMHG | HEIGHT: 61 IN | OXYGEN SATURATION: 98 %

## 2022-07-25 DIAGNOSIS — N18.6 END STAGE RENAL DISEASE: ICD-10-CM

## 2022-07-25 DIAGNOSIS — Z98.890 OTHER SPECIFIED POSTPROCEDURAL STATES: Chronic | ICD-10-CM

## 2022-07-25 DIAGNOSIS — Z99.2 DEPENDENCE ON RENAL DIALYSIS: ICD-10-CM

## 2022-07-25 DIAGNOSIS — Z49.02 ENCOUNTER FOR FITTING AND ADJUSTMENT OF PERITONEAL DIALYSIS CATHETER: Chronic | ICD-10-CM

## 2022-07-25 LAB
GLUCOSE BLDC GLUCOMTR-MCNC: 151 MG/DL — HIGH (ref 70–99)
POTASSIUM BLDV-SCNC: 4.2 MMOL/L — SIGNIFICANT CHANGE UP (ref 3.5–5.1)

## 2022-07-25 PROCEDURE — 36819 AV FUSE UPPR ARM BASILIC: CPT | Mod: LT,58

## 2022-07-25 PROCEDURE — 36819 AV FUSE UPPR ARM BASILIC: CPT | Mod: LT

## 2022-07-25 PROCEDURE — 82962 GLUCOSE BLOOD TEST: CPT

## 2022-07-25 PROCEDURE — 84132 ASSAY OF SERUM POTASSIUM: CPT

## 2022-07-25 PROCEDURE — C1889: CPT

## 2022-07-25 DEVICE — SURGICEL FIBRILLAR 2 X 4": Type: IMPLANTABLE DEVICE | Site: LEFT | Status: FUNCTIONAL

## 2022-07-25 DEVICE — SURGIFOAM PAD 8CM X 12.5CM X 10MM (100): Type: IMPLANTABLE DEVICE | Site: LEFT | Status: FUNCTIONAL

## 2022-07-25 DEVICE — CLIP APPLIER COVIDIEN SURGICLIP III 9" SM: Type: IMPLANTABLE DEVICE | Site: LEFT | Status: FUNCTIONAL

## 2022-07-25 RX ORDER — SODIUM CHLORIDE 9 MG/ML
1000 INJECTION, SOLUTION INTRAVENOUS
Refills: 0 | Status: DISCONTINUED | OUTPATIENT
Start: 2022-07-25 | End: 2022-07-25

## 2022-07-25 RX ORDER — HYDROMORPHONE HYDROCHLORIDE 2 MG/ML
0.5 INJECTION INTRAMUSCULAR; INTRAVENOUS; SUBCUTANEOUS
Refills: 0 | Status: DISCONTINUED | OUTPATIENT
Start: 2022-07-25 | End: 2022-07-25

## 2022-07-25 RX ADMIN — HYDROMORPHONE HYDROCHLORIDE 0.5 MILLIGRAM(S): 2 INJECTION INTRAMUSCULAR; INTRAVENOUS; SUBCUTANEOUS at 11:25

## 2022-07-25 RX ADMIN — HYDROMORPHONE HYDROCHLORIDE 0.5 MILLIGRAM(S): 2 INJECTION INTRAMUSCULAR; INTRAVENOUS; SUBCUTANEOUS at 11:12

## 2022-07-25 RX ADMIN — HYDROMORPHONE HYDROCHLORIDE 0.5 MILLIGRAM(S): 2 INJECTION INTRAMUSCULAR; INTRAVENOUS; SUBCUTANEOUS at 12:26

## 2022-07-25 RX ADMIN — HYDROMORPHONE HYDROCHLORIDE 0.5 MILLIGRAM(S): 2 INJECTION INTRAMUSCULAR; INTRAVENOUS; SUBCUTANEOUS at 11:51

## 2022-07-25 NOTE — ASU PREOP CHECKLIST - HEART RATE (BEATS/MIN)
[de-identified] : We talked about the nature of the condition and treatment options. Anticipatory guidance regarding mechanically oriented lower back pain was given. \par Based on great success with ESIs, Patient has been referred to physiatry for assessment regarding their current pain level and possible injection therapy. Patient has been referred to physical therapy for decreased pain modalities, stretching and strengthening modalities, soft tissue modalities, and physical modalities. I will provide a prescription for Medrol dose pack for pain relief, patient was educated on the antiinflammatory properties of this medication and how this will help their pain. Patient to follow up on a PRN basis. \par \par Prior to appointment and during encounter with patient extensive medical records were reviewed including but not limited to, hospital records, out patient records, imaging results, and lab data. During this appointment the patient was examined, diagnoses were discussed and explained in a face to face manner. In addition extensive time was spent reviewing aforementioned diagnostic studies. Counseling including abnormal image results, differential diagnoses, treatment options, risk and benefits, lifestyle changes, current condition, and current medications was performed. Patient's comments, questions, and concerns were address and patient verbalized understanding. Based on this patient's presentation at our office, which is an orthopedic spine surgeon's office, this patient inherently / intrinsically has a risk, however minute, of developing  issues such as Cauda equina syndrome, bowel and bladder changes, or progression of motor or neurological deficits such as paralysis which may be permanent.  101

## 2022-07-25 NOTE — BRIEF OPERATIVE NOTE - ELECTIVE PROCEDURE
Quality 110: Preventive Care And Screening: Influenza Immunization: Influenza Immunization Administered during Influenza season
Quality 111:Pneumonia Vaccination Status For Older Adults: Pneumococcal Vaccination Previously Received
Detail Level: Detailed
Quality 130: Documentation Of Current Medications In The Medical Record: Current Medications Documented
Yes

## 2022-07-25 NOTE — ASU DISCHARGE PLAN (ADULT/PEDIATRIC) - BRAND OF COVID-19 VACCINATION
Rapid strep negative, culture pending.    Take acetaminophen or ibuprofen for fever/discomfort  Drink plenty of fluids, warm liquids  Use saline nasal saline drops as needed  Use cool mist vaporizer to keep your moist, especially at night  Avoid smoking and
Moderna dose 1, 2, and 3

## 2022-07-25 NOTE — ASU DISCHARGE PLAN (ADULT/PEDIATRIC) - NS MD DC FALL RISK RISK
For information on Fall & Injury Prevention, visit: [email or web address] OR  [email or web address] OR  https://www.cdc.gov/steadi/patient.html

## 2022-07-25 NOTE — ASU DISCHARGE PLAN (ADULT/PEDIATRIC) - ASU DC SPECIAL INSTRUCTIONSFT
Follow up with Dr. Maldonado outpatient within 2 weeks.     You can remove the dressing in 48 hours and shower at that time. Please do you not scrub incision.

## 2022-07-25 NOTE — ASU DISCHARGE PLAN (ADULT/PEDIATRIC) - CARE PROVIDER_API CALL
Alfonso Maldonado (MD)  Surgery  Vascular  [address], [address] [place] [address]  Phone: [phone]  Fax: [phone]  Follow Up Time: 2 weeks

## 2022-08-02 ENCOUNTER — EMERGENCY (EMERGENCY)
Facility: HOSPITAL | Age: 72
LOS: 1 days | Discharge: HOME | End: 2022-08-02
Attending: EMERGENCY MEDICINE | Admitting: EMERGENCY MEDICINE

## 2022-08-02 VITALS
HEART RATE: 71 BPM | HEIGHT: 61 IN | SYSTOLIC BLOOD PRESSURE: 133 MMHG | TEMPERATURE: 99 F | RESPIRATION RATE: 18 BRPM | DIASTOLIC BLOOD PRESSURE: 61 MMHG | OXYGEN SATURATION: 98 %

## 2022-08-02 VITALS
HEART RATE: 77 BPM | RESPIRATION RATE: 18 BRPM | DIASTOLIC BLOOD PRESSURE: 61 MMHG | TEMPERATURE: 98 F | SYSTOLIC BLOOD PRESSURE: 151 MMHG | OXYGEN SATURATION: 99 %

## 2022-08-02 DIAGNOSIS — Z98.890 OTHER SPECIFIED POSTPROCEDURAL STATES: Chronic | ICD-10-CM

## 2022-08-02 DIAGNOSIS — Z49.02 ENCOUNTER FOR FITTING AND ADJUSTMENT OF PERITONEAL DIALYSIS CATHETER: Chronic | ICD-10-CM

## 2022-08-02 LAB
ALBUMIN SERPL ELPH-MCNC: 3.5 G/DL — SIGNIFICANT CHANGE UP (ref 3.3–5)
ALP SERPL-CCNC: 102 U/L — SIGNIFICANT CHANGE UP (ref 40–120)
ALT FLD-CCNC: <5 U/L — LOW (ref 4–33)
ANION GAP SERPL CALC-SCNC: 16 MMOL/L — HIGH (ref 7–14)
APTT BLD: 35.4 SEC — SIGNIFICANT CHANGE UP (ref 27–36.3)
AST SERPL-CCNC: 8 U/L — SIGNIFICANT CHANGE UP (ref 4–32)
BASOPHILS # BLD AUTO: 0.03 K/UL — SIGNIFICANT CHANGE UP (ref 0–0.2)
BASOPHILS NFR BLD AUTO: 0.3 % — SIGNIFICANT CHANGE UP (ref 0–2)
BILIRUB SERPL-MCNC: 0.3 MG/DL — SIGNIFICANT CHANGE UP (ref 0.2–1.2)
BLD GP AB SCN SERPL QL: NEGATIVE — SIGNIFICANT CHANGE UP
BUN SERPL-MCNC: 51 MG/DL — HIGH (ref 7–23)
CALCIUM SERPL-MCNC: 8.5 MG/DL — SIGNIFICANT CHANGE UP (ref 8.4–10.5)
CHLORIDE SERPL-SCNC: 97 MMOL/L — LOW (ref 98–107)
CO2 SERPL-SCNC: 24 MMOL/L — SIGNIFICANT CHANGE UP (ref 22–31)
CREAT SERPL-MCNC: 8.84 MG/DL — HIGH (ref 0.5–1.3)
EGFR: 4 ML/MIN/1.73M2 — LOW
EOSINOPHIL # BLD AUTO: 0.07 K/UL — SIGNIFICANT CHANGE UP (ref 0–0.5)
EOSINOPHIL NFR BLD AUTO: 0.8 % — SIGNIFICANT CHANGE UP (ref 0–6)
GLUCOSE SERPL-MCNC: 133 MG/DL — HIGH (ref 70–99)
HCT VFR BLD CALC: 29.5 % — LOW (ref 34.5–45)
HGB BLD-MCNC: 9.3 G/DL — LOW (ref 11.5–15.5)
IANC: 6.46 K/UL — SIGNIFICANT CHANGE UP (ref 1.8–7.4)
IMM GRANULOCYTES NFR BLD AUTO: 0.8 % — SIGNIFICANT CHANGE UP (ref 0–1.5)
INR BLD: 1.06 RATIO — SIGNIFICANT CHANGE UP (ref 0.88–1.16)
LYMPHOCYTES # BLD AUTO: 1.41 K/UL — SIGNIFICANT CHANGE UP (ref 1–3.3)
LYMPHOCYTES # BLD AUTO: 16.2 % — SIGNIFICANT CHANGE UP (ref 13–44)
MCHC RBC-ENTMCNC: 30.3 PG — SIGNIFICANT CHANGE UP (ref 27–34)
MCHC RBC-ENTMCNC: 31.5 GM/DL — LOW (ref 32–36)
MCV RBC AUTO: 96.1 FL — SIGNIFICANT CHANGE UP (ref 80–100)
MONOCYTES # BLD AUTO: 0.69 K/UL — SIGNIFICANT CHANGE UP (ref 0–0.9)
MONOCYTES NFR BLD AUTO: 7.9 % — SIGNIFICANT CHANGE UP (ref 2–14)
NEUTROPHILS # BLD AUTO: 6.46 K/UL — SIGNIFICANT CHANGE UP (ref 1.8–7.4)
NEUTROPHILS NFR BLD AUTO: 74 % — SIGNIFICANT CHANGE UP (ref 43–77)
NRBC # BLD: 0 /100 WBCS — SIGNIFICANT CHANGE UP
NRBC # FLD: 0 K/UL — SIGNIFICANT CHANGE UP
PLATELET # BLD AUTO: 188 K/UL — SIGNIFICANT CHANGE UP (ref 150–400)
POTASSIUM SERPL-MCNC: 4 MMOL/L — SIGNIFICANT CHANGE UP (ref 3.5–5.3)
POTASSIUM SERPL-SCNC: 4 MMOL/L — SIGNIFICANT CHANGE UP (ref 3.5–5.3)
PROT SERPL-MCNC: 7.3 G/DL — SIGNIFICANT CHANGE UP (ref 6–8.3)
PROTHROM AB SERPL-ACNC: 12.3 SEC — SIGNIFICANT CHANGE UP (ref 10.5–13.4)
RBC # BLD: 3.07 M/UL — LOW (ref 3.8–5.2)
RBC # FLD: 13.4 % — SIGNIFICANT CHANGE UP (ref 10.3–14.5)
RH IG SCN BLD-IMP: POSITIVE — SIGNIFICANT CHANGE UP
SARS-COV-2 RNA SPEC QL NAA+PROBE: SIGNIFICANT CHANGE UP
SODIUM SERPL-SCNC: 137 MMOL/L — SIGNIFICANT CHANGE UP (ref 135–145)
WBC # BLD: 8.73 K/UL — SIGNIFICANT CHANGE UP (ref 3.8–10.5)
WBC # FLD AUTO: 8.73 K/UL — SIGNIFICANT CHANGE UP (ref 3.8–10.5)

## 2022-08-02 PROCEDURE — 99284 EMERGENCY DEPT VISIT MOD MDM: CPT

## 2022-08-02 PROCEDURE — 99282 EMERGENCY DEPT VISIT SF MDM: CPT

## 2022-08-02 RX ORDER — MORPHINE SULFATE 50 MG/1
4 CAPSULE, EXTENDED RELEASE ORAL ONCE
Refills: 0 | Status: DISCONTINUED | OUTPATIENT
Start: 2022-08-02 | End: 2022-08-02

## 2022-08-02 RX ADMIN — MORPHINE SULFATE 4 MILLIGRAM(S): 50 CAPSULE, EXTENDED RELEASE ORAL at 14:21

## 2022-08-02 NOTE — CONSULT NOTE ADULT - SUBJECTIVE AND OBJECTIVE BOX
VASCULAR SURGERY CONSULT NOTE    HPI:  72 y.o. female w/ pmhx of CKD (on peritoneal dialysis), HTN, HLD, presents w/ c/o worsening pain in L UE following an AV fistula surgery performed .  Surgeon was Dr. Alfonso Maldonado.  She is also endorsing increased drainage that is yellowish in color and some edema.  She denies any fever, chills, nausea, vomiting, bleeding, or syncope.  Pain has been increasing since the surgery so family brought her in for evaluation of the wound and for infection.    At time of encounter patient...     PAST MEDICAL & SURGICAL HISTORY:  HTN (hypertension)      DM (diabetes mellitus)  Type 2 - Medications discontinued 1 month ago by PCP for improving HGA1C - Pt unsure of HGA1C at this time      Thyroid disease  Hypothyroidism - on Levothyroxine      Anemia  Ferrous Sulfate supplement      ESRD (end stage renal disease)  s/p Peritoneal Dialysis 6x/week overnight since 2020   L arm AV fistula creation, possible graft with Dr. Maldonado on 22       (normal spontaneous vaginal delivery)  x 3      S/P excision of lipoma      Encounter for peritoneal dialysis catheter insertion  2020      S/P arteriovenous (AV) fistula creation  left arm 2022          MEDICATIONS  (STANDING):    MEDICATIONS  (PRN):      Allergies    No Known Allergies    Intolerances        SOCIAL HISTORY:    FAMILY HISTORY:      Physical Exam:  General: NAD, resting comfortably  HEENT: NC/AT, EOMI, normal hearing, no oral lesions, no LAD, neck supple  Pulmonary: normal resp effort, CTA-B  Cardiovascular: NSR, no murmurs  Abdominal: soft, ND/NT, no organomegaly  Extremities: WWP, normal strength, no clubbing/cyanosis/edema  Neuro: A/O x 3, CNs II-XII grossly intact, normal sensation, no focal deficits  Pulses: palpable distal pulses    Vital Signs Last 24 Hrs  T(C): 37.1 (02 Aug 2022 11:43), Max: 37.1 (02 Aug 2022 11:43)  T(F): 98.8 (02 Aug 2022 11:43), Max: 98.8 (02 Aug 2022 11:43)  HR: 71 (02 Aug 2022 11:43) (71 - 71)  BP: 133/61 (02 Aug 2022 11:43) (133/61 - 133/61)  BP(mean): --  RR: 18 (02 Aug 2022 11:43) (18 - 18)  SpO2: 98% (02 Aug 2022 11:43) (98% - 98%)    Parameters below as of 02 Aug 2022 11:43  Patient On (Oxygen Delivery Method): room air        I&O's Summary          LABS:                        9.3    8.73  )-----------( 188      ( 02 Aug 2022 13:39 )             29.5     08-02    137  |  97<L>  |  51<H>  ----------------------------<  133<H>  4.0   |  24  |  8.84<H>    Ca    8.5      02 Aug 2022 13:39    TPro  7.3  /  Alb  3.5  /  TBili  0.3  /  DBili  x   /  AST  8   /  ALT  <5<L>  /  AlkPhos  102  08-02    PT/INR - ( 02 Aug 2022 13:39 )   PT: 12.3 sec;   INR: 1.06 ratio         PTT - ( 02 Aug 2022 13:39 )  PTT:35.4 sec    CAPILLARY BLOOD GLUCOSE        LIVER FUNCTIONS - ( 02 Aug 2022 13:39 )  Alb: 3.5 g/dL / Pro: 7.3 g/dL / ALK PHOS: 102 U/L / ALT: <5 U/L / AST: 8 U/L / GGT: x             Cultures:      RADIOLOGY & ADDITIONAL STUDIES:      Plan:           VASCULAR SURGERY CONSULT NOTE    HPI:  72 y.o. female w/ pmhx of CKD (on peritoneal dialysis), HTN, HLD, presents w/ c/o worsening pain in L UE following an AV fistula surgery performed .  Surgeon was Dr. Alfonso Maldonado.  She is also endorsing increased drainage that is yellowish in color and some edema.  She denies any fever, chills, nausea, vomiting, bleeding, or syncope.  Pain has been increasing since the surgery so family brought her in for evaluation of the wound and for infection.    At time of encounter patient resting comfortably. Reports pain in RUE on fistula site. Unable o express fluid from wound. Motor and sensation intact    PAST MEDICAL & SURGICAL HISTORY:  HTN (hypertension)      DM (diabetes mellitus)  Type 2 - Medications discontinued 1 month ago by PCP for improving HGA1C - Pt unsure of HGA1C at this time      Thyroid disease  Hypothyroidism - on Levothyroxine      Anemia  Ferrous Sulfate supplement      ESRD (end stage renal disease)  s/p Peritoneal Dialysis 6x/week overnight since 2020   L arm AV fistula creation, possible graft with Dr. Maldonado on 22       (normal spontaneous vaginal delivery)  x 3      S/P excision of lipoma      Encounter for peritoneal dialysis catheter insertion  2020      S/P arteriovenous (AV) fistula creation  left arm 2022          MEDICATIONS  (STANDING):    MEDICATIONS  (PRN):      Allergies    No Known Allergies    Intolerances        SOCIAL HISTORY:    FAMILY HISTORY:      Physical Exam:  General: NAD, resting comfortably  HEENT: NC/AT, EOMI, normal hearing, no oral lesions, no LAD, neck supple  Pulmonary: normal resp effort, CTA-B  Cardiovascular: NSR, no murmurs  Abdominal: soft, ND/NT, no organomegaly  Extremities: LLE fistula with thrill, no noted fluid expressed form, palpable ulnar and radial pulses    Vital Signs Last 24 Hrs  T(C): 37.1 (02 Aug 2022 11:43), Max: 37.1 (02 Aug 2022 11:43)  T(F): 98.8 (02 Aug 2022 11:43), Max: 98.8 (02 Aug 2022 11:43)  HR: 71 (02 Aug 2022 11:43) (71 - 71)  BP: 133/61 (02 Aug 2022 11:43) (133/61 - 133/61)  BP(mean): --  RR: 18 (02 Aug 2022 11:43) (18 - 18)  SpO2: 98% (02 Aug 2022 11:43) (98% - 98%)    Parameters below as of 02 Aug 2022 11:43  Patient On (Oxygen Delivery Method): room air        I&O's Summary          LABS:                        9.3    8.73  )-----------( 188      ( 02 Aug 2022 13:39 )             29.5     08-02    137  |  97<L>  |  51<H>  ----------------------------<  133<H>  4.0   |  24  |  8.84<H>    Ca    8.5      02 Aug 2022 13:39    TPro  7.3  /  Alb  3.5  /  TBili  0.3  /  DBili  x   /  AST  8   /  ALT  <5<L>  /  AlkPhos  102  08-02    PT/INR - ( 02 Aug 2022 13:39 )   PT: 12.3 sec;   INR: 1.06 ratio         PTT - ( 02 Aug 2022 13:39 )  PTT:35.4 sec    CAPILLARY BLOOD GLUCOSE        LIVER FUNCTIONS - ( 02 Aug 2022 13:39 )  Alb: 3.5 g/dL / Pro: 7.3 g/dL / ALK PHOS: 102 U/L / ALT: <5 U/L / AST: 8 U/L / GGT: x             Cultures:      RADIOLOGY & ADDITIONAL STUDIES:      Plan:  72 y.o. female w/ pmhx of CKD (on peritoneal dialysis), HTN, HLD, presents w/ c/o worsening pain in LUE following an AV fistula surgery performed .  Surgeon was Dr. Alfonso Maldonado. Patient concerned for drainage from wound. During encounter no erythema, or fluid expressed. Palpable thrill and distal pulses    - no acute surgical intervention at this time  - would recommend outpatient followup with Dr. Maldonado in 1-2 weeks  - pain control  - if patient has continued pain may schedule outpatient appointment earlier    d/w Dr. Baltazar, vascular fellow    C Team Surgery, [id number]       VASCULAR SURGERY CONSULT NOTE    HPI:  72 y.o. female w/ pmhx of CKD (on peritoneal dialysis), HTN, HLD, presents w/ c/o worsening pain in L UE following an AV fistula surgery performed .  Surgeon was Dr. Alfonso Maldonado.  She is also endorsing increased drainage that is yellowish in color and some edema.  She denies any fever, chills, nausea, vomiting, bleeding, or syncope.  Pain has been increasing since the surgery so family brought her in for evaluation of the wound and for infection.    At time of encounter patient resting comfortably. Reports pain in RUE on fistula site. Unable o express fluid from wound. Motor and sensation intact    PAST MEDICAL & SURGICAL HISTORY:  HTN (hypertension)      DM (diabetes mellitus)  Type 2 - Medications discontinued 1 month ago by PCP for improving HGA1C - Pt unsure of HGA1C at this time      Thyroid disease  Hypothyroidism - on Levothyroxine      Anemia  Ferrous Sulfate supplement      ESRD (end stage renal disease)  s/p Peritoneal Dialysis 6x/week overnight since 2020   L arm AV fistula creation, possible graft with Dr. Maldonado on 22       (normal spontaneous vaginal delivery)  x 3      S/P excision of lipoma      Encounter for peritoneal dialysis catheter insertion  2020      S/P arteriovenous (AV) fistula creation  left arm 2022          MEDICATIONS  (STANDING):    MEDICATIONS  (PRN):      Allergies    No Known Allergies    Intolerances        SOCIAL HISTORY:    FAMILY HISTORY:      Physical Exam:  General: NAD, resting comfortably  HEENT: NC/AT, EOMI, normal hearing, no oral lesions, no LAD, neck supple  Pulmonary: normal resp effort, CTA-B  Cardiovascular: NSR, no murmurs  Abdominal: soft, ND/NT, no organomegaly  Extremities: LLE fistula with thrill, no noted fluid expressed form, palpable ulnar and radial pulses    Vital Signs Last 24 Hrs  T(C): 37.1 (02 Aug 2022 11:43), Max: 37.1 (02 Aug 2022 11:43)  T(F): 98.8 (02 Aug 2022 11:43), Max: 98.8 (02 Aug 2022 11:43)  HR: 71 (02 Aug 2022 11:43) (71 - 71)  BP: 133/61 (02 Aug 2022 11:43) (133/61 - 133/61)  BP(mean): --  RR: 18 (02 Aug 2022 11:43) (18 - 18)  SpO2: 98% (02 Aug 2022 11:43) (98% - 98%)    Parameters below as of 02 Aug 2022 11:43  Patient On (Oxygen Delivery Method): room air        I&O's Summary          LABS:                        9.3    8.73  )-----------( 188      ( 02 Aug 2022 13:39 )             29.5     08-02    137  |  97<L>  |  51<H>  ----------------------------<  133<H>  4.0   |  24  |  8.84<H>    Ca    8.5      02 Aug 2022 13:39    TPro  7.3  /  Alb  3.5  /  TBili  0.3  /  DBili  x   /  AST  8   /  ALT  <5<L>  /  AlkPhos  102  08-02    PT/INR - ( 02 Aug 2022 13:39 )   PT: 12.3 sec;   INR: 1.06 ratio         PTT - ( 02 Aug 2022 13:39 )  PTT:35.4 sec    CAPILLARY BLOOD GLUCOSE        LIVER FUNCTIONS - ( 02 Aug 2022 13:39 )  Alb: 3.5 g/dL / Pro: 7.3 g/dL / ALK PHOS: 102 U/L / ALT: <5 U/L / AST: 8 U/L / GGT: x             Cultures:      RADIOLOGY & ADDITIONAL STUDIES:      Plan:  72 y.o. female w/ pmhx of CKD (on peritoneal dialysis), HTN, HLD, presents w/ c/o worsening pain in LUE following an AV fistula surgery performed .  Surgeon was Dr. Alfonso Maldonado. Patient concerned for drainage from wound. During encounter no erythema, or fluid expressed. Palpable thrill and distal pulses    - no acute surgical intervention at this time  - would recommend outpatient followup with Dr. Maldonado in 1-2 weeks  - if patient has continued pain may schedule outpatient appointment earlier  - pain control    d/w Dr. Baltazar, vascular fellow    C Team Surgery, [id number]       VASCULAR SURGERY CONSULT NOTE    HPI:  72 y.o. female w/ pmhx of CKD (on peritoneal dialysis), HTN, HLD, presents w/ c/o worsening pain in L UE following an AV fistula surgery performed .  Surgeon was Dr. Alfonso Maldonado.  She is also endorsing increased drainage that is yellowish in color and some edema.  She denies any fever, chills, nausea, vomiting, bleeding, or syncope.  Pain has been increasing since the surgery so family brought her in for evaluation of the wound and for infection.    At time of encounter patient resting comfortably. Reports pain in RUE on fistula site. Unable o express fluid from wound. Motor and sensation intact    PAST MEDICAL & SURGICAL HISTORY:  HTN (hypertension)      DM (diabetes mellitus)  Type 2 - Medications discontinued 1 month ago by PCP for improving HGA1C - Pt unsure of HGA1C at this time      Thyroid disease  Hypothyroidism - on Levothyroxine      Anemia  Ferrous Sulfate supplement      ESRD (end stage renal disease)  s/p Peritoneal Dialysis 6x/week overnight since 2020   L arm AV fistula creation, possible graft with Dr. Maldonado on 22       (normal spontaneous vaginal delivery)  x 3      S/P excision of lipoma      Encounter for peritoneal dialysis catheter insertion  2020      S/P arteriovenous (AV) fistula creation  left arm 2022          MEDICATIONS  (STANDING):    MEDICATIONS  (PRN):      Allergies    No Known Allergies    Intolerances        SOCIAL HISTORY:    FAMILY HISTORY:      Physical Exam:  General: NAD, resting comfortably  HEENT: NC/AT, EOMI, normal hearing, no oral lesions, no LAD, neck supple  Pulmonary: normal resp effort, CTA-B  Cardiovascular: NSR, no murmurs  Abdominal: soft, ND/NT, no organomegaly  Extremities: LLE fistula with thrill, no noted fluid expressed form, palpable ulnar and radial pulses    Vital Signs Last 24 Hrs  T(C): 37.1 (02 Aug 2022 11:43), Max: 37.1 (02 Aug 2022 11:43)  T(F): 98.8 (02 Aug 2022 11:43), Max: 98.8 (02 Aug 2022 11:43)  HR: 71 (02 Aug 2022 11:43) (71 - 71)  BP: 133/61 (02 Aug 2022 11:43) (133/61 - 133/61)  BP(mean): --  RR: 18 (02 Aug 2022 11:43) (18 - 18)  SpO2: 98% (02 Aug 2022 11:43) (98% - 98%)    Parameters below as of 02 Aug 2022 11:43  Patient On (Oxygen Delivery Method): room air        I&O's Summary          LABS:                        9.3    8.73  )-----------( 188      ( 02 Aug 2022 13:39 )             29.5     08-02    137  |  97<L>  |  51<H>  ----------------------------<  133<H>  4.0   |  24  |  8.84<H>    Ca    8.5      02 Aug 2022 13:39    TPro  7.3  /  Alb  3.5  /  TBili  0.3  /  DBili  x   /  AST  8   /  ALT  <5<L>  /  AlkPhos  102  08-02    PT/INR - ( 02 Aug 2022 13:39 )   PT: 12.3 sec;   INR: 1.06 ratio         PTT - ( 02 Aug 2022 13:39 )  PTT:35.4 sec    CAPILLARY BLOOD GLUCOSE        LIVER FUNCTIONS - ( 02 Aug 2022 13:39 )  Alb: 3.5 g/dL / Pro: 7.3 g/dL / ALK PHOS: 102 U/L / ALT: <5 U/L / AST: 8 U/L / GGT: x             Cultures:      RADIOLOGY & ADDITIONAL STUDIES:      Plan:  72 y.o. female w/ pmhx of CKD (on peritoneal dialysis), HTN, HLD, presents w/ c/o worsening pain in LUE following an AV fistula surgery performed .  Surgeon was Dr. Alfonso Maldonado. Patient concerned for drainage from wound. During encounter no erythema, or fluid expressed. Palpable thrill and distal pulses    - no acute surgical intervention at this time  - would recommend outpatient followup with Dr. Maldonado in 1-2 weeks  - if patient has continued pain may schedule outpatient appointment earlier ([phone])  - pain control    d/w Dr. Baltazar, vascular fellow    C Team Surgery, [id number]

## 2022-08-02 NOTE — ED PROVIDER NOTE - CARE PROVIDER_API CALL
Alfonso Maldonado (MD)  Surgery  Vascular  [address] [facility], [address] [place] [address]  Phone: [phone]  Fax: [phone]  Follow Up Time: 4-6 Days

## 2022-08-02 NOTE — ED ADULT NURSE NOTE - OBJECTIVE STATEMENT
patient presents with pain and drainage to L after s/p Av fistula placement done on 7/25/2022. patient is Albanian speaking, son is art bedside and reports patient started experiencing pain last night and noticed drainage to the site. brown drainage on keflex noted. swelling noted to L arm, no redness noted. son reports giving patient 2 tylenols with no relief. denies CP, sob, n/v, fever/chills.

## 2022-08-02 NOTE — ED PROVIDER NOTE - PATIENT PORTAL LINK FT
Patient is a 11 y.o. male presenting with abdominal pain and headaches. The history is provided by the patient and the mother. Pediatric Social History:    Abdominal Pain    This is a new problem. The current episode started more than 2 days ago. The problem occurs constantly. The problem has not changed since onset. The pain is located in the epigastric region and LUQ. The quality of the pain is aching. The pain is mild. Associated symptoms include nausea and headaches. Pertinent negatives include no fever, no diarrhea, no vomiting, no constipation, no arthralgias, no chest pain and no back pain. Nothing worsens the pain. The pain is relieved by nothing. The patient's surgical history non-contributory. Headache    This is a new problem. The current episode started less than 1 hour ago. The pain is located in the generalized region. The quality of the pain is described as throbbing. The pain is mild. Associated symptoms include nausea. Pertinent negatives include no fever, no palpitations, no syncope, no shortness of breath, no tingling, no dizziness, no visual change and no vomiting. Treatments tried: BC powders, one time. The treatment provided moderate (temporary) relief. History reviewed. No pertinent past medical history. Past Surgical History:   Procedure Laterality Date    HX HEENT           History reviewed. No pertinent family history. Social History     Social History    Marital status: SINGLE     Spouse name: N/A    Number of children: N/A    Years of education: N/A     Occupational History    Not on file. Social History Main Topics    Smoking status: Never Smoker    Smokeless tobacco: Not on file    Alcohol use No    Drug use: No    Sexual activity: Not on file     Other Topics Concern    Not on file     Social History Narrative         ALLERGIES: Review of patient's allergies indicates no known allergies.     Review of Systems   Constitutional: Negative for chills and fever.   HENT: Negative for rhinorrhea and sore throat. Eyes: Negative for discharge and redness. Respiratory: Negative for cough and shortness of breath. Cardiovascular: Negative for chest pain, palpitations and syncope. Gastrointestinal: Positive for abdominal pain and nausea. Negative for constipation, diarrhea and vomiting. Musculoskeletal: Negative for arthralgias and back pain. Skin: Negative for rash. Neurological: Positive for headaches. Negative for dizziness and tingling. All other systems reviewed and are negative. Vitals:    02/28/18 1012 02/28/18 1443   BP: 143/74 124/68   Pulse: 72 68   Resp: 18 20   Temp: 97.5 °F (36.4 °C) 97.9 °F (36.6 °C)   SpO2: 96% 98%   Weight: 147.4 kg             Physical Exam   Constitutional: He is oriented to person, place, and time. He appears well-developed and well-nourished. No distress. HENT:   Head: Normocephalic and atraumatic. Eyes: Conjunctivae and EOM are normal. Pupils are equal, round, and reactive to light. Right eye exhibits no discharge. Left eye exhibits no discharge. No scleral icterus. Neck: Normal range of motion. Neck supple. Cardiovascular: Normal rate, regular rhythm and normal heart sounds. Exam reveals no gallop. No murmur heard. Pulmonary/Chest: Effort normal and breath sounds normal. No respiratory distress. He has no wheezes. He has no rales. Abdominal: Soft. Bowel sounds are normal. There is no tenderness. There is no guarding. Musculoskeletal: Normal range of motion. He exhibits no edema. Neurological: He is alert and oriented to person, place, and time. He exhibits normal muscle tone. cni 2-12 grossly   Skin: Skin is warm and dry. He is not diaphoretic. Psychiatric: He has a normal mood and affect. His behavior is normal.   Nursing note and vitals reviewed.        MDM  Number of Diagnoses or Management Options  Abdominal pain, LUQ (left upper quadrant):   Acute non intractable tension-type headache: Nausea without vomiting:   Diagnosis management comments: Medical decision making note:  Frequent headaches now associated with nausea  Abdominal pain ×3 days onset prior to taking BC powder  Labs look okay feeling better after Compazine, Fioricet, Maalox  Possible migraine  Rx  F/u  This concludes the \"medical decision making note\" part of this emergency department visit note.           ED Course       Procedures You can access the FollowMyHealth Patient Portal offered by [facility] by registering at the following website: [email or web address]. By joining [employer]’s FollowMyHealth portal, you will also be able to view your health information using other applications (apps) compatible with our system.

## 2022-08-02 NOTE — ED PROVIDER NOTE - PHYSICAL EXAMINATION
GEN: Pt in NAD, A&O x4  PSYCH: Affect appropriate.  EYES: Sclera white w/o injection.   ENT: Head NCAT. MMM. Neck supple FROM.  RESP: CTA b/l, no wheezes, rales, or rhonchi.   CARDIAC: RRR, clear distinct S1, S2  ABD: Abdomen soft, non-tender. No CVAT b/l.  VASC: 2+ radial and dorsalis pedis pulses b/l. No edema or calf tenderness.  +palpable thrill in L. UE AV fistula  SKIN: Mild edema inferior to surgical excision w/ mild drainage.  No bleeding or erythema of the site.

## 2022-08-02 NOTE — ED PROVIDER NOTE - NS ED ATTENDING STATEMENT MOD
I have seen and examined this patient and fully participated in the care of this patient as the teaching attending.  The service was shared with the JULIANN.  I reviewed and verified the documentation and independently performed the documented:

## 2022-08-02 NOTE — ED PROVIDER NOTE - NSCAREINITIATED _GEN_ER
Ms Neely is a 72 yo AAF that presented to the ED with co chest pain and was found to be in AF with RVR. She was started on Cardizem IV to slow her rate down and then she became hypotensive. She was then given fluid bolus. She remains on Cardizem IV. Her cardiologist is Dr Frazier. Describes the pain as substernal chest tightness that started when she was at [employer], she then felt palpitations and SOB.  She has a history of COPD and is on chronic oxygen at 3 L via NC. She was SOB and she was placed on BiPAP. Continues to use cigarettes daily.  Also has a history of ESRD and diaylsis days are M/W/F and follows with Dr Morocho. She is anuric.  Past medical history includes: Left atrial thrombus, CHF, COPD, Persistant AF, HTN, CAD, ESRD, Tobacco abuse.   Disha Danielle(NP)

## 2022-08-02 NOTE — ED ADULT TRIAGE NOTE - CHIEF COMPLAINT QUOTE
Pt had procedure for left AV fistula placement on 7/25 and c/o increase in pain with drainage to the site since last night. PMH HTN, hyprothyroid, ESRD on peritoneal dialysis, hld

## 2022-08-02 NOTE — ED PROVIDER NOTE - OBJECTIVE STATEMENT
72 y.o. female w/ pmhx of CKD (on peritoneal dialysis), HTN, HLD, presents w/ c/o worsening pain in L UE following an AV fistula surgery performed 7/27.  Surgeon was Dr. Alfonso Maldonado.  She is also endorsing increased drainage that is yellowish in color and some edema.  She denies any fever, chills, nausea, vomiting, bleeding, or syncope.  Pain has been increasing since the surgery so family brought her in for evaluation of the wound and for infection.

## 2022-08-02 NOTE — ED PROVIDER NOTE - ATTENDING CONTRIBUTION TO CARE
GEN - nontoxic, ao3  HEAD - NC/AT   EYES- PERRL, EOMI  ENT: Airway patent, mmm, Oral cavity and pharynx normal. No inflammation, swelling, exudate, or lesions.  NECK: Neck supple, non-tender without lymphadenopathy, no masses.  PULMONARY - CTA b/l, symmetric breath sounds.   CARDIAC -s1s2, RRR, no M,G,R  ABDOMEN - +BS, ND, NT, soft, no guarding, no rebound, no masses   BACK - no CVA tenderness, Normal  spine   EXTREMITIES - FROM, + thrill to lue, no erythema/induration/dehiscence, generally tender to area, no regions of fluctuance, sensation normal, full strength to extremity, brisk cap refill present, no deformity  SKIN - no rash or bruising   NEUROLOGIC - alert, speech clear, no focal deficits  PSYCH -nl mood/affect, nl insight.  a/p-pain to lue in region of her operation -basilic vein transposition-good thrill, appears to be healing well, no signs of infection on exam-patient reports pain to arm since the surgery, possibly worsened over last few days-extremity is warm and well perfused, nvi. Plan for labs, vascular consult, pain control.

## 2022-08-02 NOTE — CONSULT NOTE ADULT - ASSESSMENT
70F with PMH HTN, T2DM, ESRD with recently placed PD cath, not started on HD yet, presented with nausea and vomiting.    ESRD: on PD  will continue manual exchanges here  no urgent indication today  will resume in the am  consent in chart  monitor electrolyte     AVF pain  f/u vascular    Anemia  sent iron panel  monitor    HTN  acceptable  resume home meds  monitor    ckdmbd  check pth, phos level  monitor

## 2022-08-02 NOTE — ED PROVIDER NOTE - PROGRESS NOTE DETAILS
BRAVO Danielle NP:  Pt reports pain has improved significantly after meds.  currently resting comfortably.  Pending Sx eval. BRAVO Danielle NP:  Spoke w/ Sx resident An who reports pt was seen by Fellow and she is awaiting confirmation of plan from her attending. Pt received in signout from Dr Lyles   The patient is a 72y Female who has a past medical and surgery history of HTN DM hypothyroidism Anemia on Fe ESRD CAPD 6xweek until AV fistula created by Dr. Maldonado on 5/6/22 PTED with concerns about drainage from wound.    Vital Signs Last 24 Hrs  T(F): 98.4 HR: 73 BP: 159/59 RR: 18 SpO2: 99% (02 Aug 2022 17:31) (98% - 99%)  PE: as described; my additions and exceptions are noted in the chart; no erythema Palpable thrill and distal pulses    DATA:    LAB:                       9.3    8.73  )-----------( 188      ( 02 Aug 2022 13:39 )             29.5   PT: 12.3 sec;   INR: 1.06 ratio  PTT:35.4 [id number]    137  |  97<L>  |  51<H>  ----------------------------<  133<H>  4.0   |  24  |  8.84<H>    Ca    8.5      02 Aug 2022 13:39    TPro  7.3  /  Alb  3.5  /  TBili  0.3  /  DBili  x   /  AST  8   /  ALT  <5<L>  /  AlkPhos  102  08-02        IMPRESSION/RISK:  Dx= AVF that is functional with drainage but no erythema fever or chills    Plan  Patient seen by vascular/note appreciated, no acute intevention at this time     -will followup with Dr Maldonado in 1-2 weeks; Contact info provided; RTED PRN

## 2022-08-02 NOTE — CONSULT NOTE ADULT - ATTENDING COMMENTS
agree with above  AVF site clean ,dry, and intact  no purulent drainage  no palpable fluctuance or induration  palpable thrill throughout upper arm  no leukocytosis or fever  okay from vascular standpoint to discharge home with outpatient follow up

## 2022-08-02 NOTE — ED PROVIDER NOTE - CLINICAL SUMMARY MEDICAL DECISION MAKING FREE TEXT BOX
72 y.o. female presents to ED 6 days s/p AV fistula sx in L. arm with c/o increasing and persistent arm pain.  Exam did not reveal any infection of the surgical site - no erythema, purulent drainage, or warmth.  Mild edema likely due to dependent position of the arm.  However, given HPI and ROS, will obtain labs for eval and consult vascular sx. Alexandra Danielle NP

## 2022-08-10 ENCOUNTER — APPOINTMENT (OUTPATIENT)
Dept: VASCULAR SURGERY | Facility: CLINIC | Age: 72
End: 2022-08-10

## 2022-08-10 VITALS
HEIGHT: 63 IN | TEMPERATURE: 98 F | BODY MASS INDEX: 26.75 KG/M2 | WEIGHT: 151 LBS | HEART RATE: 65 BPM | SYSTOLIC BLOOD PRESSURE: 107 MMHG | DIASTOLIC BLOOD PRESSURE: 66 MMHG

## 2022-08-10 DIAGNOSIS — N18.6 END STAGE RENAL DISEASE: ICD-10-CM

## 2022-08-10 DIAGNOSIS — Z99.2 END STAGE RENAL DISEASE: ICD-10-CM

## 2022-08-10 PROCEDURE — 93990 DOPPLER FLOW TESTING: CPT

## 2022-08-10 PROCEDURE — 99024 POSTOP FOLLOW-UP VISIT: CPT

## 2022-08-10 RX ORDER — MIRTAZAPINE 15 MG/1
15 TABLET, FILM COATED ORAL
Qty: 30 | Refills: 0 | Status: ACTIVE | COMMUNITY
Start: 2022-05-13

## 2022-08-10 RX ORDER — HYDRALAZINE HYDROCHLORIDE 25 MG/1
25 TABLET ORAL
Qty: 90 | Refills: 0 | Status: ACTIVE | COMMUNITY
Start: 2022-05-13

## 2022-08-10 RX ORDER — ACETAMINOPHEN EXTRA STRENGTH 500 MG/1
500 TABLET ORAL
Qty: 60 | Refills: 0 | Status: ACTIVE | COMMUNITY
Start: 2022-02-10

## 2022-08-10 RX ORDER — GENTAMICIN SULFATE 1 MG/G
0.1 CREAM TOPICAL
Qty: 15 | Refills: 0 | Status: ACTIVE | COMMUNITY
Start: 2022-07-28

## 2022-08-10 RX ORDER — GABAPENTIN 400 MG/1
400 CAPSULE ORAL
Qty: 30 | Refills: 0 | Status: ACTIVE | COMMUNITY
Start: 2022-05-13

## 2022-08-10 RX ORDER — ONDANSETRON 4 MG/1
4 TABLET ORAL
Qty: 2 | Refills: 0 | Status: ACTIVE | COMMUNITY
Start: 2022-07-28

## 2022-08-10 RX ORDER — FERROUS SULFATE TAB EC 324 MG (65 MG FE EQUIVALENT) 324 (65 FE) MG
324 (65 FE) TABLET DELAYED RESPONSE ORAL
Qty: 30 | Refills: 0 | Status: ACTIVE | COMMUNITY
Start: 2022-03-15

## 2022-08-10 RX ORDER — METRONIDAZOLE 500 MG/1
500 TABLET ORAL
Qty: 30 | Refills: 0 | Status: ACTIVE | COMMUNITY
Start: 2022-05-14

## 2022-08-10 NOTE — REASON FOR VISIT
[Family Member] : family member [de-identified] : Left basilic vein transposition [de-identified] : 07/25/22 [de-identified] : Doing well, denies complaints. Pain resolving.

## 2022-08-10 NOTE — PHYSICAL EXAM
[Respiratory Effort] : normal respiratory effort [Normal Rate and Rhythm] : normal rate and rhythm [2+] : left 2+ [Abdomen Tenderness] : ~T ~M No abdominal tenderness [Skin Ulcer] : no ulcer [Alert] : alert [Oriented to Person] : oriented to person [Oriented to Place] : oriented to place [Oriented to Time] : oriented to time [Calm] : calm [de-identified] : appears stated age [de-identified] : normocephalic, atraumatic [de-identified] : supple [FreeTextEntry1] : palpable thrill throughout left upper arm AVF

## 2022-08-10 NOTE — DISCUSSION/SUMMARY
[FreeTextEntry1] : Problem #1 Dialysis access\par - LAZARA demonstrates 7mm fistula with excellent volume flow\par - Can start using for hemodialysis in one week

## 2022-08-17 ENCOUNTER — APPOINTMENT (OUTPATIENT)
Dept: SURGERY | Facility: CLINIC | Age: 72
End: 2022-08-17

## 2022-08-17 VITALS
WEIGHT: 137.44 LBS | SYSTOLIC BLOOD PRESSURE: 116 MMHG | DIASTOLIC BLOOD PRESSURE: 67 MMHG | BODY MASS INDEX: 24.35 KG/M2 | HEART RATE: 65 BPM | OXYGEN SATURATION: 98 % | TEMPERATURE: 98.2 F | HEIGHT: 63 IN

## 2022-08-17 PROCEDURE — 99212 OFFICE O/P EST SF 10 MIN: CPT

## 2022-08-25 ENCOUNTER — OUTPATIENT (OUTPATIENT)
Dept: OUTPATIENT SERVICES | Facility: HOSPITAL | Age: 72
LOS: 1 days | Discharge: HOME | End: 2022-08-25

## 2022-08-25 VITALS
TEMPERATURE: 98 F | HEIGHT: 62 IN | SYSTOLIC BLOOD PRESSURE: 103 MMHG | DIASTOLIC BLOOD PRESSURE: 52 MMHG | WEIGHT: 145.73 LBS | HEART RATE: 67 BPM | OXYGEN SATURATION: 97 % | RESPIRATION RATE: 18 BRPM

## 2022-08-25 DIAGNOSIS — Z98.890 OTHER SPECIFIED POSTPROCEDURAL STATES: Chronic | ICD-10-CM

## 2022-08-25 DIAGNOSIS — Z49.02 ENCOUNTER FOR FITTING AND ADJUSTMENT OF PERITONEAL DIALYSIS CATHETER: Chronic | ICD-10-CM

## 2022-08-25 DIAGNOSIS — N18.5 CHRONIC KIDNEY DISEASE, STAGE 5: ICD-10-CM

## 2022-08-25 DIAGNOSIS — E03.9 HYPOTHYROIDISM, UNSPECIFIED: ICD-10-CM

## 2022-08-25 DIAGNOSIS — N18.6 END STAGE RENAL DISEASE: ICD-10-CM

## 2022-08-25 DIAGNOSIS — I10 ESSENTIAL (PRIMARY) HYPERTENSION: ICD-10-CM

## 2022-08-25 DIAGNOSIS — Z01.818 ENCOUNTER FOR OTHER PREPROCEDURAL EXAMINATION: ICD-10-CM

## 2022-08-25 LAB
ANION GAP SERPL CALC-SCNC: 9 MMOL/L — SIGNIFICANT CHANGE UP (ref 5–17)
BLD GP AB SCN SERPL QL: SIGNIFICANT CHANGE UP
BUN SERPL-MCNC: 30 MG/DL — HIGH (ref 7–23)
CALCIUM SERPL-MCNC: 10.1 MG/DL — SIGNIFICANT CHANGE UP (ref 8.5–10.1)
CHLORIDE SERPL-SCNC: 100 MMOL/L — SIGNIFICANT CHANGE UP (ref 96–108)
CO2 SERPL-SCNC: 28 MMOL/L — SIGNIFICANT CHANGE UP (ref 22–31)
CREAT SERPL-MCNC: 7.82 MG/DL — HIGH (ref 0.5–1.3)
EGFR: 5 ML/MIN/1.73M2 — LOW
GLUCOSE SERPL-MCNC: 188 MG/DL — HIGH (ref 70–99)
HCT VFR BLD CALC: 28.3 % — LOW (ref 34.5–45)
HGB BLD-MCNC: 9.2 G/DL — LOW (ref 11.5–15.5)
MCHC RBC-ENTMCNC: 31.7 PG — SIGNIFICANT CHANGE UP (ref 27–34)
MCHC RBC-ENTMCNC: 32.5 G/DL — SIGNIFICANT CHANGE UP (ref 32–36)
MCV RBC AUTO: 97.6 FL — SIGNIFICANT CHANGE UP (ref 80–100)
NRBC # BLD: 0 /100 WBCS — SIGNIFICANT CHANGE UP (ref 0–0)
PLATELET # BLD AUTO: 237 K/UL — SIGNIFICANT CHANGE UP (ref 150–400)
POTASSIUM SERPL-MCNC: 3.3 MMOL/L — LOW (ref 3.5–5.3)
POTASSIUM SERPL-SCNC: 3.3 MMOL/L — LOW (ref 3.5–5.3)
RBC # BLD: 2.9 M/UL — LOW (ref 3.8–5.2)
RBC # FLD: 13.9 % — SIGNIFICANT CHANGE UP (ref 10.3–14.5)
SODIUM SERPL-SCNC: 137 MMOL/L — SIGNIFICANT CHANGE UP (ref 135–145)
WBC # BLD: 8.65 K/UL — SIGNIFICANT CHANGE UP (ref 3.8–10.5)
WBC # FLD AUTO: 8.65 K/UL — SIGNIFICANT CHANGE UP (ref 3.8–10.5)

## 2022-08-25 PROCEDURE — 93010 ELECTROCARDIOGRAM REPORT: CPT

## 2022-08-25 RX ORDER — SODIUM CHLORIDE 9 MG/ML
3 INJECTION INTRAMUSCULAR; INTRAVENOUS; SUBCUTANEOUS EVERY 8 HOURS
Refills: 0 | Status: DISCONTINUED | OUTPATIENT
Start: 2022-09-06 | End: 2022-09-06

## 2022-08-25 NOTE — H&P PST ADULT - NSANTHNECKRD_ENT_A_CORE
No seroquel 100 mg QHS  sertraline 100 mg QD (please give at 9AM as pt. prefers to take this in the morning)  gabapentin 300 mg TID  hold meds if severe sedation    Thiamine 500mg IV tid for 2-3 days.    Monitor closely for alcohol withdrawal sign and symptoms, consider restart Librium taper if patient started to show sxs of withdrawal, scoring high on CIWA or requiring multiple prns.

## 2022-08-25 NOTE — H&P PST ADULT - OTHER CARE PROVIDERS
Dr. Calderon (Nephrologist) from [facility] in [place] [phone] - Last visit 2 weeks ago for regular F/U; Dr. Mir Williamson (Cardiologist) [phone] - Last visit 1/2021 as a work-up for kidney transplant list

## 2022-08-25 NOTE — H&P PST ADULT - ASSESSMENT
72 year old Czech-speaking female with PMH HTN, Hypothyroidism, DMT2 (Meds D/C'ed  in 3/2022 by PCP for improving HGA1C) and ESRD s/p Peritoneal Dialysis (2020) reports that she is traveling back home to [facility] and is unable to perform peritoneal dialysis in her home country (left arm AV fistula creation with Dr. Maldonado on 22) . She is scheduled for the removal her peritoneal cath on 2022.    CAPRINI SCORE [CLOT]    AGE RELATED RISK FACTORS                                                       MOBILITY RELATED FACTORS  [ ] Age 41-60 years                                            (1 Point)                  [ ] Bed rest                                                        (1 Point)  [x ] Age: 61-74 years                                           (2 Points)                 [ ] Plaster cast                                                   (2 Points)  [ ] Age= 75 years                                              (3 Points)                 [ ] Bed bound for more than 72 hours                 (2 Points)    DISEASE RELATED RISK FACTORS                                               GENDER SPECIFIC FACTORS  [ ] Edema in the lower extremities                       (1 Point)                  [ ] Pregnancy                                                     (1 Point)  [ ] Varicose veins                                               (1 Point)                  [ ] Post-partum < 6 weeks                                   (1 Point)             [x ] BMI > 25 Kg/m2                                            (1 Point)                  [ ] Hormonal therapy  or oral contraception          (1 Point)                 [ ] Sepsis (in the previous month)                        (1 Point)                  [ ] History of pregnancy complications                 (1 point)  [ ] Pneumonia or serious lung disease                                               [ ] Unexplained or recurrent                     (1 Point)           (in the previous month)                               (1 Point)  [ ] Abnormal pulmonary function test                     (1 Point)                 SURGERY RELATED RISK FACTORS  [ ] Acute myocardial infarction                              (1 Point)                 [ ]  Section                                             (1 Point)  [ ] Congestive heart failure (in the previous month)  (1 Point)               [ ] Minor surgery                                                  (1 Point)   [ ] Inflammatory bowel disease                             (1 Point)                 [ ] Arthroscopic surgery                                        (2 Points)  [ x] Central venous access                                      (2 Points)                [x ] General surgery lasting more than 45 minutes   (2 Points)       [ ] Stroke (in the previous month)                          (5 Points)               [ ] Elective arthroplasty                                         (5 Points)                                                                                                                                               HEMATOLOGY RELATED FACTORS                                                 TRAUMA RELATED RISK FACTORS  [ ] Prior episodes of VTE                                     (3 Points)                [ ] Fracture of the hip, pelvis, or leg                       (5 Points)  [ ] Positive family history for VTE                         (3 Points)                 [ ] Acute spinal cord injury (in the previous month)  (5 Points)  [ ] Prothrombin 42713 A                                     (3 Points)                 [ ] Paralysis  (less than 1 month)                             (5 Points)  [ ] Factor V Leiden                                             (3 Points)                  [ ] Multiple Trauma within 1 month                        (5 Points)  [ ] Lupus anticoagulants                                     (3 Points)                                                           [ ] Anticardiolipin antibodies                               (3 Points)                                                       [ ] High homocysteine in the blood                      (3 Points)                                             [ ] Other congenital or acquired thrombophilia      (3 Points)                                                [ ] Heparin induced thrombocytopenia                  (3 Points)                                          Total Score [       7   ]    Caprini Score 0 - 2:  Low Risk, No VTE Prophylaxis required for most patients, encourage ambulation  Caprini Score 3 - 6:  At Risk, pharmacologic VTE prophylaxis is indicated for most patients (in the absence of a contraindication)  Caprini Score Greater than or = 7:  High Risk, pharmacologic VTE prophylaxis is indicated for most patients (in the absence of a contraindication)    Caprini score indicates that the patient is high risk for VTE event ( score 6 or greater). Surgical patient's in this group will benefit from both pharmacologic prophylaxis and intermittent compression devices . Surgical team will determine the balance between VTE  risk and bleeding risk and other clinical considerations

## 2022-08-25 NOTE — H&P PST ADULT - PROBLEM SELECTOR PLAN 1
stage 2 left arm basilic vein transposition  Labs- CBC, BMP, Hb A1c  Pre op instructions discussed with son- verbalized understanding  PCP evaluation on 7/2/22 Removal of a peritoneal dialysis catheter   Hibiclens instructions provided   medical clearance required  covid swab is required 3-5 days prior to surgery  anesthesiologist to review PST labs, EKG, medical clearance and optimization for surgery

## 2022-08-25 NOTE — H&P PST ADULT - SKIN
warm and dry/color normal/normal/no rashes/no ulcers pale dusky appearance son states pt normal complexion/warm and dry/color normal/pale/normal/no rashes/no ulcers

## 2022-08-25 NOTE — H&P PST ADULT - HISTORY OF PRESENT ILLNESS
72 year old Turkish-speaking female with PMH HTN, Hypothyroidism, DMT2 (Meds D/C'ed  in 3/2022 by PCP for improving HGA1C) and ESRD s/p Peritoneal Dialysis (6/2020) reports that she is traveling back home to [facility] and is unable to perform peritoneal dialysis in her home country. Pt had Left arm AV fistula creation with Dr. Maldonado on 5/6/22.  had f/u vascular consult- scheduled for stage 2 left arm basilic vein transposition on 7/15/22    Pt does Peritoneal Dialysis Exchanges 6 nights per week - Son will discuss the plan for peritoneal dialysis regarding the night before the surgery  [facility] [place]  [phone]    **Covid PCR test scheduled for 7/12/22 at [facility]  Covid vaccine Moderna 2nd dose received 3/11/21; Moderna Booster received 12/18/21  **Pt denies any fever, chills, CP/SOB or any recent hospitalizations over the last 3 months  Denies recent travel, exposure or Covid symptoms     Now being dialysized via Left AV fisutual on T, Thur, Saturday.  72 year old Azeri-speaking female with PMH HTN, Hypothyroidism, DMT2 (Meds D/C'ed  in 3/2022 by PCP for improving HGA1C) and ESRD s/p Peritoneal Dialysis (6/2020) reports that she is traveling back home to [facility] and is unable to perform peritoneal dialysis in her home country (left arm AV fistula creation with Dr. Maldonado on 5/6/22) . She is scheduled for the removal her peritoneal cath on 9/6/2022.    Covid vaccine Moderna 2nd dose received 3/11/21; Moderna Booster received 12/18/21  **Pt denies any fever, chills, CP/SOB or any recent hospitalizations over the last 3 months  Denies recent travel, exposure or Covid symptoms     Now being dialysized via Left AV fisutual on T, Thur, Saturday.

## 2022-09-05 ENCOUNTER — TRANSCRIPTION ENCOUNTER (OUTPATIENT)
Age: 72
End: 2022-09-05

## 2022-09-06 ENCOUNTER — TRANSCRIPTION ENCOUNTER (OUTPATIENT)
Age: 72
End: 2022-09-06

## 2022-09-06 ENCOUNTER — APPOINTMENT (OUTPATIENT)
Dept: SURGERY | Facility: HOSPITAL | Age: 72
End: 2022-09-06

## 2022-09-06 ENCOUNTER — OUTPATIENT (OUTPATIENT)
Dept: OUTPATIENT SERVICES | Facility: HOSPITAL | Age: 72
LOS: 1 days | Discharge: HOME | End: 2022-09-06

## 2022-09-06 VITALS
HEIGHT: 62 IN | OXYGEN SATURATION: 100 % | WEIGHT: 145.73 LBS | DIASTOLIC BLOOD PRESSURE: 77 MMHG | TEMPERATURE: 98 F | RESPIRATION RATE: 18 BRPM | HEART RATE: 79 BPM | SYSTOLIC BLOOD PRESSURE: 123 MMHG

## 2022-09-06 VITALS
RESPIRATION RATE: 17 BRPM | HEART RATE: 79 BPM | SYSTOLIC BLOOD PRESSURE: 135 MMHG | TEMPERATURE: 97 F | DIASTOLIC BLOOD PRESSURE: 68 MMHG | OXYGEN SATURATION: 96 %

## 2022-09-06 DIAGNOSIS — Z98.890 OTHER SPECIFIED POSTPROCEDURAL STATES: Chronic | ICD-10-CM

## 2022-09-06 DIAGNOSIS — Z49.02 ENCOUNTER FOR FITTING AND ADJUSTMENT OF PERITONEAL DIALYSIS CATHETER: Chronic | ICD-10-CM

## 2022-09-06 LAB
GLUCOSE BLDC GLUCOMTR-MCNC: 152 MG/DL — HIGH (ref 70–99)
POTASSIUM SERPL-MCNC: 3.8 MMOL/L — SIGNIFICANT CHANGE UP (ref 3.5–5.3)
POTASSIUM SERPL-SCNC: 3.8 MMOL/L — SIGNIFICANT CHANGE UP (ref 3.5–5.3)

## 2022-09-06 PROCEDURE — ZZZZZ: CPT

## 2022-09-06 PROCEDURE — 49422 REMOVE TUNNELED IP CATH: CPT

## 2022-09-06 RX ORDER — BISOPROLOL FUMARATE 10 MG/1
1 TABLET, FILM COATED ORAL
Qty: 0 | Refills: 0 | DISCHARGE

## 2022-09-06 RX ORDER — SEVELAMER CARBONATE 2400 MG/1
2 POWDER, FOR SUSPENSION ORAL
Qty: 0 | Refills: 0 | DISCHARGE

## 2022-09-06 RX ORDER — ACETAMINOPHEN 500 MG
1000 TABLET ORAL ONCE
Refills: 0 | Status: DISCONTINUED | OUTPATIENT
Start: 2022-09-06 | End: 2022-09-06

## 2022-09-06 RX ORDER — SODIUM CHLORIDE 9 MG/ML
1000 INJECTION, SOLUTION INTRAVENOUS
Refills: 0 | Status: DISCONTINUED | OUTPATIENT
Start: 2022-09-06 | End: 2022-09-06

## 2022-09-06 RX ORDER — AMLODIPINE BESYLATE 2.5 MG/1
1 TABLET ORAL
Qty: 0 | Refills: 0 | DISCHARGE

## 2022-09-06 RX ORDER — ATORVASTATIN CALCIUM 80 MG/1
1 TABLET, FILM COATED ORAL
Qty: 0 | Refills: 0 | DISCHARGE

## 2022-09-06 RX ORDER — GABAPENTIN 400 MG/1
1 CAPSULE ORAL
Qty: 0 | Refills: 0 | DISCHARGE

## 2022-09-06 RX ORDER — OXYCODONE HYDROCHLORIDE 5 MG/1
5 TABLET ORAL ONCE
Refills: 0 | Status: DISCONTINUED | OUTPATIENT
Start: 2022-09-06 | End: 2022-09-06

## 2022-09-06 RX ORDER — LEVOTHYROXINE SODIUM 125 MCG
1 TABLET ORAL
Qty: 0 | Refills: 0 | DISCHARGE

## 2022-09-06 RX ORDER — FENTANYL CITRATE 50 UG/ML
25 INJECTION INTRAVENOUS
Refills: 0 | Status: DISCONTINUED | OUTPATIENT
Start: 2022-09-06 | End: 2022-09-06

## 2022-09-06 NOTE — BRIEF OPERATIVE NOTE - NSICDXBRIEFPOSTOP_GEN_ALL_CORE_FT
POST-OP DIAGNOSIS:  Peritoneal dialysis catheter dysfunction 06-Sep-2022 15:25:06  Srinivasa Diego

## 2022-09-06 NOTE — ASU PATIENT PROFILE, ADULT - NSICDXPASTMEDICALHX_GEN_ALL_CORE_FT
PAST MEDICAL HISTORY:  Anemia Ferrous Sulfate supplement    DM (diabetes mellitus) Type 2 - Medications discontinued 1 month ago by PCP for improving HGA1C - Pt unsure of HGA1C at this time    ESRD (end stage renal disease) s/p Peritoneal Dialysis 6x/week overnight since 2020   L arm AV fistula creation, possible graft with Dr. Maldonado on 22    HTN (hypertension)      (normal spontaneous vaginal delivery) x 3    Thyroid disease Hypothyroidism - on Levothyroxine     PAST MEDICAL HISTORY:  Anemia Ferrous Sulfate supplement    DM (diabetes mellitus) Type 2 - Medications discontinued 1 month ago by PCP for improving HGA1C - Pt unsure of HGA1C at this time    ESRD (end stage renal disease) s/p Peritoneal Dialysis 6x/week overnight since 2020   L arm AV fistula creation, possible graft with Dr. Maldonado on 22    HTN (hypertension) not on meds  now     (normal spontaneous vaginal delivery) x 3    Thyroid disease Hypothyroidism - on Levothyroxine

## 2022-09-06 NOTE — ASU DISCHARGE PLAN (ADULT/PEDIATRIC) - NS MD DC FALL RISK RISK
For information on Fall & Injury Prevention, visit: [email or web address] OR  [email or web address] OR  https://www.cdc.gov/steadi/patient.html

## 2022-09-07 PROBLEM — I10 ESSENTIAL (PRIMARY) HYPERTENSION: Chronic | Status: ACTIVE | Noted: 2020-06-12

## 2022-09-08 DIAGNOSIS — N18.6 END STAGE RENAL DISEASE: ICD-10-CM

## 2022-09-08 DIAGNOSIS — F32.A DEPRESSION, UNSPECIFIED: ICD-10-CM

## 2022-09-08 DIAGNOSIS — E78.5 HYPERLIPIDEMIA, UNSPECIFIED: ICD-10-CM

## 2022-09-08 DIAGNOSIS — F41.9 ANXIETY DISORDER, UNSPECIFIED: ICD-10-CM

## 2022-09-08 DIAGNOSIS — D64.9 ANEMIA, UNSPECIFIED: ICD-10-CM

## 2022-09-08 DIAGNOSIS — M19.90 UNSPECIFIED OSTEOARTHRITIS, UNSPECIFIED SITE: ICD-10-CM

## 2022-09-08 DIAGNOSIS — E03.9 HYPOTHYROIDISM, UNSPECIFIED: ICD-10-CM

## 2022-09-08 DIAGNOSIS — T85.611A BREAKDOWN (MECHANICAL) OF INTRAPERITONEAL DIALYSIS CATHETER, INITIAL ENCOUNTER: ICD-10-CM

## 2022-09-08 DIAGNOSIS — E11.22 TYPE 2 DIABETES MELLITUS WITH DIABETIC CHRONIC KIDNEY DISEASE: ICD-10-CM
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
## 2022-09-08 DIAGNOSIS — I12.0 HYPERTENSIVE CHRONIC KIDNEY DISEASE WITH STAGE 5 CHRONIC KIDNEY DISEASE OR END STAGE RENAL DISEASE: ICD-10-CM

## 2022-09-14 ENCOUNTER — APPOINTMENT (OUTPATIENT)
Dept: SURGERY | Facility: CLINIC | Age: 72
End: 2022-09-14

## 2022-09-14 PROCEDURE — 99024 POSTOP FOLLOW-UP VISIT: CPT

## 2024-07-18 NOTE — PRE-ANESTHESIA EVALUATION ADULT - NSATTENDATTESTRD_GEN_ALL_CORE
Advance Directives: Care Instructions  Overview  An advance directive is a legal way to state your wishes at the end of your life. It tells your family and your doctor what to do if you can't say what you want.  There are two main types of advance directives. You can change them any time your wishes change.  Living will.  This form tells your family and your doctor your wishes about life support and other treatment. The form is also called a declaration.  Medical power of .  This form lets you name a person to make treatment decisions for you when you can't speak for yourself. This person is called a health care agent (health care proxy, health care surrogate). The form is also called a durable power of  for health care.  If you do not have an advance directive, decisions about your medical care may be made by a family member, or by a doctor or a  who doesn't know you.  It may help to think of an advance directive as a gift to the people who care for you. If you have one, they won't have to make tough decisions by themselves.  For more information, including forms for your state, see the [employer] website (www.c[employer].org/planning/advance-directives/).  Follow-up care is a key part of your treatment and safety. Be sure to make and go to all appointments, and call your doctor if you are having problems. It's also a good idea to know your test results and keep a list of the medicines you take.  What should you include in an advance directive?  Many states have a unique advance directive form. (It may ask you to address specific issues.) Or you might use a universal form that's approved by many states.  If your form doesn't tell you what to address, it may be hard to know what to include in your advance directive. Use the questions below to help you get started.  Who do you want to make decisions about your medical care if you are not able to?  What life-support measures do you want if you 
The patient has been re-examined and I agree with the above assessment or I updated with my findings.

## 2024-11-15 NOTE — ASU PATIENT PROFILE, ADULT - NSICDXPASTSURGICALHX_GEN_ALL_CORE_FT
PAST SURGICAL HISTORY:  Encounter for peritoneal dialysis catheter insertion 6/2020    S/P arteriovenous (AV) fistula creation left arm 5/6/2022    S/P excision of lipoma     
Satisfactory

## (undated) DEVICE — SPECIMEN CONTAINER 100ML

## (undated) DEVICE — SHEARS COVIDIEN ENDO SHEAR 5MM X 31CM W UNIPOLAR CAUTERY

## (undated) DEVICE — DRSG TEGADERM 6"X8"

## (undated) DEVICE — VESSEL LOOP MINI-BLUE 0.075" X 16"

## (undated) DEVICE — SUT VICRYL 0 27" UR-6

## (undated) DEVICE — SOL IRR POUR H2O 250ML

## (undated) DEVICE — DRSG STERISTRIPS 0.5 X 4"

## (undated) DEVICE — DISSECTOR ENDO PEANUT 5MM

## (undated) DEVICE — VENODYNE/SCD SLEEVE CALF MEDIUM

## (undated) DEVICE — DRAPE INSTRUMENT POUCH 6.75" X 11"

## (undated) DEVICE — BLADE SCALPEL SAFETYLOCK #11

## (undated) DEVICE — GLV 8 PROTEXIS (WHITE)

## (undated) DEVICE — DRAPE TOWEL BLUE 17" X 24"

## (undated) DEVICE — WARMING BLANKET LOWER ADULT

## (undated) DEVICE — STAPLER SKIN VISI-STAT 35 WIDE

## (undated) DEVICE — SUT PROLENE 7-0 24" BV-1

## (undated) DEVICE — DRAPE LIGHT HANDLE COVER (BLUE)

## (undated) DEVICE — GLV 6.5 PROTEXIS (WHITE)

## (undated) DEVICE — VENODYNE/SCD SLEEVE CALF LARGE

## (undated) DEVICE — VESSEL LOOP MAXI-BLUE 0.120" X 16"

## (undated) DEVICE — SUT MONOCRYL 4-0 18" PS-2

## (undated) DEVICE — BLADE SCALPEL SAFETYLOCK #10

## (undated) DEVICE — VISITEC 4X4

## (undated) DEVICE — WARMING BLANKET UPPER ADULT

## (undated) DEVICE — SUT SOFSILK 4-0 18" TIES

## (undated) DEVICE — GLV 7.5 PROTEXIS (WHITE)

## (undated) DEVICE — SUT PROLENE 6-0 4-30" C-1

## (undated) DEVICE — PREP CHLORAPREP HI-LITE ORANGE 26ML

## (undated) DEVICE — GOWN XL

## (undated) DEVICE — SUT SOFSILK 2-0 18" TIES

## (undated) DEVICE — DRAPE 3/4 SHEET W REINFORCEMENT 56X77"

## (undated) DEVICE — SOL INJ NS 0.9% 500ML 1-PORT

## (undated) DEVICE — PACK AV FISTULA

## (undated) DEVICE — SUT MONOCRYL 4-0 27" PS-2 UNDYED

## (undated) DEVICE — BLADE SCALPEL SAFETYLOCK #15

## (undated) DEVICE — SUT PROLENE 6-0 4-30" BV-1

## (undated) DEVICE — SUT BIOSYN 4-0 18" P-12

## (undated) DEVICE — GOWN TRIMAX LG

## (undated) DEVICE — TROCAR COVIDIEN BLUNT TIP HASSAN 10MM

## (undated) DEVICE — CLAMP BULLDOG MIDI STRAIGHT (YELLOW) DISP

## (undated) DEVICE — SUCTION YANKAUER NO CONTROL VENT

## (undated) DEVICE — GLV 7 PROTEXIS (WHITE)

## (undated) DEVICE — FRA-ESU BOVIE FORCE FX F3B25825A: Type: DURABLE MEDICAL EQUIPMENT

## (undated) DEVICE — CLAMP BULLDOG MIDI 45 DEGREE (GREEN) DISP

## (undated) DEVICE — DRSG CURITY GAUZE SPONGE 4 X 4" 12-PLY

## (undated) DEVICE — SUT SILK 3-0 18" TIES

## (undated) DEVICE — DRSG OPSITE 13.75 X 4"

## (undated) DEVICE — MEDICATION LABELS W MARKER

## (undated) DEVICE — SUT POLYSORB 3-0 30" V-20 UNDYED

## (undated) DEVICE — PACK GENERAL LAPAROSCOPY

## (undated) DEVICE — CLAMP BULLDOG MIDI 45 DEGREE (YELLOW) DISP

## (undated) DEVICE — TUBING STRYKEFLOW II SUCTION / IRRIGATOR

## (undated) DEVICE — SOL IRR POUR NS 0.9% 500ML

## (undated) DEVICE — CAP DISCONNECT PD MINI

## (undated) DEVICE — SUT VICRYL 3-0 27" SH UNDYED

## (undated) DEVICE — SUT ETHILON 3-0 30" KS

## (undated) DEVICE — MINICAP EXTENDED LIFE TRANSFER SET WITH TWIST CLAMP 10CM

## (undated) DEVICE — TROCAR COVIDIEN VERSAONE BLADELESS FIXATION 5MM STANDARD